# Patient Record
Sex: FEMALE | Employment: UNEMPLOYED | URBAN - METROPOLITAN AREA
[De-identification: names, ages, dates, MRNs, and addresses within clinical notes are randomized per-mention and may not be internally consistent; named-entity substitution may affect disease eponyms.]

---

## 2020-02-15 LAB — HBA1C MFR BLD HPLC: 6.6 %

## 2020-08-17 ENCOUNTER — VIRTUAL VISIT (OUTPATIENT)
Dept: FAMILY MEDICINE CLINIC | Facility: CLINIC | Age: 45
End: 2020-08-17

## 2020-08-17 NOTE — PROGRESS NOTES
Pt feeling better and wanted to cancel appt but was not able to get through so asked that I cancel it today when we connected.

## 2020-09-07 ENCOUNTER — HOSPITAL ENCOUNTER (EMERGENCY)
Age: 45
Discharge: HOME | End: 2020-09-07
Attending: EMERGENCY MEDICINE
Payer: MEDICARE

## 2020-09-07 VITALS
HEART RATE: 73 BPM | DIASTOLIC BLOOD PRESSURE: 60 MMHG | SYSTOLIC BLOOD PRESSURE: 134 MMHG | TEMPERATURE: 97.9 F | RESPIRATION RATE: 14 BRPM | OXYGEN SATURATION: 100 %

## 2020-09-07 DIAGNOSIS — K31.84 GASTROPARESIS: ICD-10-CM

## 2020-09-07 DIAGNOSIS — E10.65 TYPE 1 DIABETES MELLITUS WITH HYPERGLYCEMIA (HCC): Primary | ICD-10-CM

## 2020-09-07 LAB
ALBUMIN SERPL-MCNC: 4.3 G/DL (ref 3.4–5)
ALBUMIN/GLOB SERPL: 1.3 {RATIO} (ref 0.8–1.7)
ALP SERPL-CCNC: 114 U/L (ref 45–117)
ALT SERPL-CCNC: 36 U/L (ref 13–56)
ANION GAP SERPL CALC-SCNC: 8 MMOL/L (ref 3–18)
AST SERPL-CCNC: 50 U/L (ref 10–38)
BASOPHILS # BLD: 0 K/UL (ref 0–0.1)
BASOPHILS NFR BLD: 0 % (ref 0–2)
BILIRUB SERPL-MCNC: 0.3 MG/DL (ref 0.2–1)
BUN SERPL-MCNC: 11 MG/DL (ref 7–18)
BUN/CREAT SERPL: 13 (ref 12–20)
CALCIUM SERPL-MCNC: 8.9 MG/DL (ref 8.5–10.1)
CHLORIDE SERPL-SCNC: 104 MMOL/L (ref 100–111)
CO2 SERPL-SCNC: 22 MMOL/L (ref 21–32)
CREAT SERPL-MCNC: 0.88 MG/DL (ref 0.6–1.3)
DIFFERENTIAL METHOD BLD: ABNORMAL
EOSINOPHIL # BLD: 0.1 K/UL (ref 0–0.4)
EOSINOPHIL NFR BLD: 0 % (ref 0–5)
ERYTHROCYTE [DISTWIDTH] IN BLOOD BY AUTOMATED COUNT: 12.7 % (ref 11.6–14.5)
ETHANOL SERPL-MCNC: <3 MG/DL (ref 0–3)
GLOBULIN SER CALC-MCNC: 3.2 G/DL (ref 2–4)
GLUCOSE BLD STRIP.AUTO-MCNC: 263 MG/DL (ref 70–110)
GLUCOSE SERPL-MCNC: 375 MG/DL (ref 74–99)
HCT VFR BLD AUTO: 43.2 % (ref 35–45)
HGB BLD-MCNC: 15 G/DL (ref 12–16)
LIPASE SERPL-CCNC: 54 U/L (ref 73–393)
LYMPHOCYTES # BLD: 2.8 K/UL (ref 0.9–3.6)
LYMPHOCYTES NFR BLD: 21 % (ref 21–52)
MAGNESIUM SERPL-MCNC: 2.1 MG/DL (ref 1.6–2.6)
MCH RBC QN AUTO: 31.6 PG (ref 24–34)
MCHC RBC AUTO-ENTMCNC: 34.7 G/DL (ref 31–37)
MCV RBC AUTO: 91.1 FL (ref 74–97)
MONOCYTES # BLD: 0.8 K/UL (ref 0.05–1.2)
MONOCYTES NFR BLD: 6 % (ref 3–10)
NEUTS SEG # BLD: 9.9 K/UL (ref 1.8–8)
NEUTS SEG NFR BLD: 73 % (ref 40–73)
PH BLDV: 7.43 [PH] (ref 7.32–7.42)
PLATELET # BLD AUTO: 335 K/UL (ref 135–420)
PMV BLD AUTO: 10.4 FL (ref 9.2–11.8)
POTASSIUM SERPL-SCNC: 4.4 MMOL/L (ref 3.5–5.5)
PROT SERPL-MCNC: 7.5 G/DL (ref 6.4–8.2)
RBC # BLD AUTO: 4.74 M/UL (ref 4.2–5.3)
SODIUM SERPL-SCNC: 134 MMOL/L (ref 136–145)
WBC # BLD AUTO: 13.5 K/UL (ref 4.6–13.2)

## 2020-09-07 PROCEDURE — C9113 INJ PANTOPRAZOLE SODIUM, VIA: HCPCS | Performed by: EMERGENCY MEDICINE

## 2020-09-07 PROCEDURE — 96374 THER/PROPH/DIAG INJ IV PUSH: CPT

## 2020-09-07 PROCEDURE — 80307 DRUG TEST PRSMV CHEM ANLYZR: CPT

## 2020-09-07 PROCEDURE — 82800 BLOOD PH: CPT

## 2020-09-07 PROCEDURE — 74011250636 HC RX REV CODE- 250/636: Performed by: EMERGENCY MEDICINE

## 2020-09-07 PROCEDURE — 93005 ELECTROCARDIOGRAM TRACING: CPT

## 2020-09-07 PROCEDURE — 85025 COMPLETE CBC W/AUTO DIFF WBC: CPT

## 2020-09-07 PROCEDURE — 83690 ASSAY OF LIPASE: CPT

## 2020-09-07 PROCEDURE — 96375 TX/PRO/DX INJ NEW DRUG ADDON: CPT

## 2020-09-07 PROCEDURE — 80053 COMPREHEN METABOLIC PANEL: CPT

## 2020-09-07 PROCEDURE — 83735 ASSAY OF MAGNESIUM: CPT

## 2020-09-07 PROCEDURE — 82962 GLUCOSE BLOOD TEST: CPT

## 2020-09-07 PROCEDURE — 99285 EMERGENCY DEPT VISIT HI MDM: CPT

## 2020-09-07 RX ORDER — DIPHENHYDRAMINE HYDROCHLORIDE 50 MG/ML
25 INJECTION, SOLUTION INTRAMUSCULAR; INTRAVENOUS ONCE
Status: COMPLETED | OUTPATIENT
Start: 2020-09-07 | End: 2020-09-07

## 2020-09-07 RX ORDER — MORPHINE SULFATE 2 MG/ML
4 INJECTION, SOLUTION INTRAMUSCULAR; INTRAVENOUS ONCE
Status: COMPLETED | OUTPATIENT
Start: 2020-09-07 | End: 2020-09-07

## 2020-09-07 RX ORDER — DIAZEPAM 10 MG/2ML
5 INJECTION INTRAMUSCULAR
Status: COMPLETED | OUTPATIENT
Start: 2020-09-07 | End: 2020-09-07

## 2020-09-07 RX ORDER — PREDNISONE 50 MG/1
50 TABLET ORAL DAILY
Qty: 3 TAB | Refills: 0 | Status: SHIPPED | OUTPATIENT
Start: 2020-09-07 | End: 2020-09-10

## 2020-09-07 RX ORDER — OMEPRAZOLE 40 MG/1
40 CAPSULE, DELAYED RELEASE ORAL DAILY
Qty: 5 CAP | Refills: 0 | Status: SHIPPED | OUTPATIENT
Start: 2020-09-07 | End: 2022-10-21

## 2020-09-07 RX ORDER — PANTOPRAZOLE SODIUM 40 MG/10ML
40 INJECTION, POWDER, LYOPHILIZED, FOR SOLUTION INTRAVENOUS
Status: COMPLETED | OUTPATIENT
Start: 2020-09-07 | End: 2020-09-07

## 2020-09-07 RX ORDER — HALOPERIDOL 5 MG/ML
5 INJECTION INTRAMUSCULAR
Status: COMPLETED | OUTPATIENT
Start: 2020-09-07 | End: 2020-09-07

## 2020-09-07 RX ORDER — INSULIN LISPRO 100 [IU]/ML
12 INJECTION, SOLUTION INTRAVENOUS; SUBCUTANEOUS
COMMUNITY
End: 2021-05-27 | Stop reason: ALTCHOICE

## 2020-09-07 RX ORDER — METOCLOPRAMIDE HYDROCHLORIDE 5 MG/ML
10 INJECTION INTRAMUSCULAR; INTRAVENOUS
Status: COMPLETED | OUTPATIENT
Start: 2020-09-07 | End: 2020-09-07

## 2020-09-07 RX ADMIN — HALOPERIDOL LACTATE 5 MG: 5 INJECTION, SOLUTION INTRAMUSCULAR at 08:30

## 2020-09-07 RX ADMIN — METOCLOPRAMIDE 10 MG: 5 INJECTION, SOLUTION INTRAMUSCULAR; INTRAVENOUS at 07:15

## 2020-09-07 RX ADMIN — DIPHENHYDRAMINE HYDROCHLORIDE 25 MG: 50 INJECTION, SOLUTION INTRAMUSCULAR; INTRAVENOUS at 07:14

## 2020-09-07 RX ADMIN — MORPHINE SULFATE 4 MG: 2 INJECTION, SOLUTION INTRAMUSCULAR; INTRAVENOUS at 07:16

## 2020-09-07 RX ADMIN — SODIUM CHLORIDE 1000 ML: 900 INJECTION, SOLUTION INTRAVENOUS at 07:15

## 2020-09-07 RX ADMIN — DIAZEPAM 5 MG: 5 INJECTION, SOLUTION INTRAMUSCULAR; INTRAVENOUS at 07:58

## 2020-09-07 RX ADMIN — PANTOPRAZOLE SODIUM 40 MG: 40 INJECTION, POWDER, FOR SOLUTION INTRAVENOUS at 10:18

## 2020-09-07 RX ADMIN — SODIUM CHLORIDE 1000 ML: 900 INJECTION, SOLUTION INTRAVENOUS at 09:16

## 2020-09-07 NOTE — ED NOTES
I have reviewed discharge information with the patient. Patient expressed understanding. Vss, pt in NAD. Patient ambulated independently out of care area.  [facility]

## 2020-09-07 NOTE — ED NOTES
Pt arrived with 1l bottle of water. Bottle noted to be 3/4 gone. I asked pt if she has been drinking and pt states she has been thirsty.

## 2020-09-07 NOTE — ED PROVIDER NOTES
EMERGENCY DEPARTMENT HISTORY AND PHYSICAL EXAM    7:02 AM      Date: 9/7/2020  Patient Name: Nathan Barrios    History of Presenting Illness     Chief Complaint   Patient presents with    Abdominal Pain         History Provided By: patient    Additional History (Context): Nathan Barrios is a 38 y.o. female presents with type 1 diabetes and gastroparesis history, comes in with another episode of gastroparesis starting at 5 in the morning continuous dry heaving very severe epigastric pain characteristic of her gastroparesis episodes. Denies drug use. She had a similar episode on the third of the month. No diarrhea or fever. Did not check her sugar this morning but by EMS was 350. Says morphine and Reglan seem to work best for her. Grayson Javier PCP: Elissa Ibrahim PA-C    Chief Complaint:   Duration:    Timing:    Location:   Quality:   Severity:   Modifying Factors:   Associated Symptoms:       Current Outpatient Medications   Medication Sig Dispense Refill    sertraline HCl (ZOLOFT PO) Take  by mouth.  insulin lispro (HumaLOG U-100 Insulin) 100 unit/mL injection by SubCUTAneous route.  predniSONE (DELTASONE) 50 mg tablet Take 1 Tab by mouth daily for 3 days. 3 Tab 0    omeprazole (PRILOSEC) 40 mg capsule Take 1 Cap by mouth daily. 5 Cap 0       Past History     Past Medical History:  Past Medical History:   Diagnosis Date    Depression     Diabetes ([address]     Gastroparesis     PTSD (post-traumatic stress disorder)        Past Surgical History:  Past Surgical History:   Procedure Laterality Date    HX APPENDECTOMY         Family History:  History reviewed. No pertinent family history. Social History:  Social History     Tobacco Use    Smoking status: Not on file   Substance Use Topics    Alcohol use: Yes    Drug use: Not Currently       Allergies:   Allergies   Allergen Reactions    Amoxicillin Nausea and Vomiting         Review of Systems     Review of Systems   Constitutional: Negative for diaphoresis and fever. HENT: Negative for congestion and sore throat. Eyes: Negative for pain and itching. Respiratory: Negative for cough and shortness of breath. Cardiovascular: Negative for chest pain and palpitations. Gastrointestinal: Positive for abdominal pain and vomiting. Negative for diarrhea. Endocrine: Negative for polydipsia and polyuria. Genitourinary: Negative for dysuria and hematuria. Musculoskeletal: Negative for arthralgias and myalgias. Skin: Negative for rash and wound. Neurological: Negative for seizures and syncope. Hematological: Does not bruise/bleed easily. Psychiatric/Behavioral: Negative for agitation and hallucinations. Physical Exam       Patient Vitals for the past 12 hrs:   Temp Pulse Resp BP SpO2   09/07/20 0900 -- -- -- 134/60 --   09/07/20 0830 -- -- -- 200/78 --   09/07/20 0800 -- -- -- 177/76 --   09/07/20 0730 -- -- -- 174/78 --   09/07/20 0701 97.9 °F (36.6 °C) 73 14 (!) 197/106 100 %   09/07/20 0700 -- -- -- (!) 197/106 --       Physical Exam  Vitals signs and nursing note reviewed. Constitutional:       General: She is in acute distress. Appearance: She is well-developed. She is diaphoretic. HENT:      Head: Normocephalic and atraumatic. Eyes:      General: No scleral icterus. Conjunctiva/sclera: Conjunctivae normal.   Neck:      Musculoskeletal: Normal range of motion and neck supple. Vascular: No JVD. Cardiovascular:      Rate and Rhythm: Normal rate and regular rhythm. Heart sounds: Normal heart sounds. Comments: 4 intact extremity pulses  Pulmonary:      Effort: Pulmonary effort is normal.      Breath sounds: Normal breath sounds. Abdominal:      Palpations: Abdomen is soft. There is no mass. Tenderness: There is abdominal tenderness in the epigastric area. Comments: Actively dry heaving. Musculoskeletal: Normal range of motion.    Lymphadenopathy:      Cervical: No cervical adenopathy. Skin:     General: Skin is warm. Neurological:      Mental Status: She is alert. Diagnostic Study Results   Labs -  Recent Results (from the past 12 hour(s))   CBC WITH AUTOMATED DIFF    Collection Time: 09/07/20  7:05 AM   Result Value Ref Range    WBC 13.5 (H) 4.6 - 13.2 K/uL    RBC 4.74 4.20 - 5.30 M/uL    HGB 15.0 12.0 - 16.0 g/dL    HCT 43.2 35.0 - 45.0 %    MCV 91.1 74.0 - 97.0 FL    MCH 31.6 24.0 - 34.0 PG    MCHC 34.7 31.0 - 37.0 g/dL    RDW 12.7 11.6 - 14.5 %    PLATELET 348 332 - 483 K/uL    MPV 10.4 9.2 - 11.8 FL    NEUTROPHILS 73 40 - 73 %    LYMPHOCYTES 21 21 - 52 %    MONOCYTES 6 3 - 10 %    EOSINOPHILS 0 0 - 5 %    BASOPHILS 0 0 - 2 %    ABS. NEUTROPHILS 9.9 (H) 1.8 - 8.0 K/UL    ABS. LYMPHOCYTES 2.8 0.9 - 3.6 K/UL    ABS. MONOCYTES 0.8 0.05 - 1.2 K/UL    ABS. EOSINOPHILS 0.1 0.0 - 0.4 K/UL    ABS. BASOPHILS 0.0 0.0 - 0.1 K/UL    DF AUTOMATED     MAGNESIUM    Collection Time: 09/07/20  7:05 AM   Result Value Ref Range    Magnesium 2.1 1.6 - 2.6 mg/dL   METABOLIC PANEL, COMPREHENSIVE    Collection Time: 09/07/20  7:05 AM   Result Value Ref Range    Sodium 134 (L) 136 - 145 mmol/L    Potassium 4.4 3.5 - 5.5 mmol/L    Chloride 104 100 - 111 mmol/L    CO2 22 21 - 32 mmol/L    Anion gap 8 3.0 - 18 mmol/L    Glucose 375 (H) 74 - 99 mg/dL    BUN 11 7.0 - 18 MG/DL    Creatinine 0.88 0.6 - 1.3 MG/DL    BUN/Creatinine ratio 13 12 - 20      GFR est AA >60 >60 ml/min/1.73m2    GFR est non-AA >60 >60 ml/min/1.73m2    Calcium 8.9 8.5 - 10.1 MG/DL    Bilirubin, total 0.3 0.2 - 1.0 MG/DL    ALT (SGPT) 36 13 - 56 U/L    AST (SGOT) 50 (H) 10 - 38 U/L    Alk.  phosphatase 114 45 - 117 U/L    Protein, total 7.5 6.4 - 8.2 g/dL    Albumin 4.3 3.4 - 5.0 g/dL    Globulin 3.2 2.0 - 4.0 g/dL    A-G Ratio 1.3 0.8 - 1.7     LIPASE    Collection Time: 09/07/20  7:05 AM   Result Value Ref Range    Lipase 54 (L) 73 - 393 U/L   PH, VENOUS    Collection Time: 09/07/20  7:05 AM   Result Value Ref Range VENOUS PH 7.43 (H) 7.32 - 7.42     ETHYL ALCOHOL    Collection Time: 09/07/20  7:05 AM   Result Value Ref Range    ALCOHOL(ETHYL),SERUM <3 0 - 3 MG/DL   EKG, 12 LEAD, INITIAL    Collection Time: 09/07/20  8:02 AM   Result Value Ref Range    Ventricular Rate 82 BPM    Atrial Rate 82 BPM    P-R Interval 126 ms    QRS Duration 78 ms    Q-T Interval 394 ms    QTC Calculation (Bezet) 460 ms    Calculated P Axis 71 degrees    Calculated R Axis 34 degrees    Calculated T Axis 28 degrees    Diagnosis       Normal sinus rhythm  Normal ECG  No previous ECGs available         Radiologic Studies -   No orders to display     No results found. Medications ordered:   Medications   sodium chloride 0.9 % bolus infusion 1,000 mL (0 mL IntraVENous IV Completed 9/7/20 0745)   morphine injection 4 mg (4 mg IntraVENous Given 9/7/20 0716)   metoclopramide HCl (REGLAN) injection 10 mg (10 mg IntraVENous Given 9/7/20 0715)   diphenhydrAMINE (BENADRYL) injection 25 mg (25 mg IntraVENous Given 9/7/20 0714)   diazePAM (VALIUM) injection 5 mg (5 mg IntraVENous Given 9/7/20 0758)   haloperidol lactate (HALDOL) injection 5 mg (5 mg IntraVENous Given 9/7/20 0830)   sodium chloride 0.9 % bolus infusion 1,000 mL (1,000 mL IntraVENous New Bag 9/7/20 0916)   pantoprazole (PROTONIX) injection 40 mg (40 mg IntraVENous Given 9/7/20 1018)         Medical Decision Making   Initial Medical Decision Making and DDx:     Most suggestive of gastroparesis. Do not suspect particularly pancreatitis acute cholecystitis. DKA is possible. ED Course: Progress Notes, Reevaluation, and Consults:  ED Course as of Sep 07 1113   Mon Sep 07, 2020   0805 Twelve-lead EKG at 8:02 AM, sinus rhythm at 82 no prolongation of the QTC. [CB]   0827 Patient still feeling nauseous, she is no longer vomiting or dry heaving. We will try Haldol.    [CB]   3242 No evidence of DKA or hyperosmolar state. Consistent with gastroparesis.   We will continue to treat.    [CB]   2950 Vomiting is subsided she is received 2 L of fluid, will recheck her blood sugar and likely discharge her. No alarming metabolic abnormalities. [CB]      ED Course User Index  [CB] Elijah Zaragoza MD     11:13 AM she still feels uncomfortable but no for other dry heaving. We will put her on a few days of omeprazole and recheck her blood sugar. I am the first provider for this patient. I reviewed the vital signs, available nursing notes, past medical history, past surgical history, family history and social history. Patient Vitals for the past 12 hrs:   Temp Pulse Resp BP SpO2   09/07/20 0900 -- -- -- 134/60 --   09/07/20 0830 -- -- -- 200/78 --   09/07/20 0800 -- -- -- 177/76 --   09/07/20 0730 -- -- -- 174/78 --   09/07/20 0701 97.9 °F (36.6 °C) 73 14 (!) 197/106 100 %   09/07/20 0700 -- -- -- (!) 197/106 --       Vital Signs-Reviewed the patient's vital signs. Pulse Oximetry Analysis, Cardiac Monitor, 12 lead ekg:      Interpreted by the EP. Records Reviewed: Nursing notes reviewed (Time of Review: 7:02 AM)    Procedures:   Critical Care Time:   Aspirin: (was aspirin given for stroke?)    Diagnosis     Clinical Impression:   1. Type 1 diabetes mellitus with hyperglycemia (HCC)    2. Gastroparesis        Disposition: Discharged      Follow-up Information     Follow up With Specialties Details Why Contact Info    Pilo Liu PA-C Physician Assistant In 2 days  [address] [address]  [phone]             Patient's Medications   Start Taking    OMEPRAZOLE (PRILOSEC) 40 MG CAPSULE    Take 1 Cap by mouth daily. PREDNISONE (DELTASONE) 50 MG TABLET    Take 1 Tab by mouth daily for 3 days. Continue Taking    INSULIN LISPRO (HUMALOG U-100 INSULIN) 100 UNIT/ML INJECTION    by SubCUTAneous route. SERTRALINE HCL (ZOLOFT PO)    Take  by mouth.    These Medications have changed    No medications on file   Stop Taking    No medications on file     _______________________________    Notes:    Gladys Rash, MD using Dragon dictation      _______________________________

## 2020-09-07 NOTE — ED NOTES
Pt observed sticking her fingers down her throat. Pt encouraged to stop. Eloina Urias \"This is how I make the nausea go away, I have been doing this for years. \"

## 2020-09-07 NOTE — DISCHARGE INSTRUCTIONS
Patient Education        Diabetes Blood Sugar Emergencies: Your Action Plan  How can you prevent a blood sugar emergency? An important part of living with diabetes is keeping your blood sugar in your target range. You'll need to know what to do if it's too high or too low. Managing your blood sugar levels helps you avoid emergencies. This care sheet will teach you about the signs of high and low blood sugar. It will help you make an action plan with your doctor for when these signs occur. Low blood sugar is more likely to happen if you take certain medicines for diabetes. It can also happen if you skip a meal, drink alcohol, or exercise more than usual.  You may get high blood sugar if you eat differently than you normally do. One example is eating more carbohydrate than usual. Having a cold, the flu, or other sudden illness can also cause high blood sugar levels. Levels can also rise if you miss a dose of medicine. Any change in how you take your medicine may affect your blood sugar level. So it's important to work with your doctor before you make any changes. Check your blood sugar  Work with your doctor to fill in the blank spaces below that apply to you. Track your levels, know your target range, and write down ways you can get your blood sugar back in your target range. A log book can help you track your levels. Take the book to all of your medical appointments. · Check your blood sugar _____ times a day, at these times:________________________________________________. (For example: Before meals, at bedtime, before exercise, during exercise, other.)  · Your blood sugar target range before a meal is ___________________. Your blood sugar target range after a meal is _______________________. · Do this--___________________________________________________--to get your blood sugar back within your safe range if your blood sugar results are _________________________________________.  (For example: Less than 70 or above 250 mg/dL.)  Call your doctor when your blood sugar results are ___________________________________. (For example: Less than 70 or above 250 mg/dL.)  What are the symptoms of low and high blood sugar? Common symptoms of low blood sugar are sweating and feeling shaky, weak, hungry, or confused. Symptoms can start quickly. Common symptoms of high blood sugar are feeling very thirsty or very hungry. You may also pass urine more often than usual. You may have blurry vision and may lose weight without trying. But some people may have high or low blood sugar without having any symptoms. That's a good reason to check your blood sugar on a regular schedule. What should you do if you have symptoms? Work with your doctor to fill in the blank spaces below that apply to you. Low blood sugar  If you have symptoms of low blood sugar, check your blood sugar. If it's below _____ ( for example, below 70), eat or drink a quick-sugar food that has about 15 grams of carbohydrate. Your goal is to get your level back to your safe range. Check your blood sugar again 15 minutes later. If it's still not in your target range, take another 15 grams of carbohydrate and check your blood sugar again in 15 minutes. Repeat this until you reach your target. Then go back to your regular testing schedule. Children usually need less than 15 grams of carbohydrate. Check with your doctor or diabetes educator for the amount that is right for your child. When you have low blood sugar, it's best to stop or reduce any physical activity until your blood sugar is back in your target range and is stable. If you must stay active, eat or drink 30 grams of carbohydrate. Then check your blood sugar again in 15 minutes. If it's not in your target range, take another 30 grams of carbohydrates. Check your blood sugar again in 15 minutes. Keep doing this until you reach your target. You can then go back to your regular testing schedule.   If your symptoms or blood sugar levels are getting worse or have not improved after 15 minutes, seek medical care right away. Here are some examples of quick-sugar foods with 15 grams of carbohydrate:  · 3 or 4 glucose tablets  · 1 tablespoon (3 teaspoons) table sugar  · ½ cup to ¾ cup (4 to 6 ounces) of fruit juice or regular (not diet) soda  · Hard candy (such as 6 Life Savers)  High blood sugar  If you have symptoms of high blood sugar, check your blood sugar. Your goal is to get your level back to your target range. If it's above ______ ( for example, above 250), follow these steps:  · If you missed a dose of your diabetes medicine, take it now. Take only the amount of medicine that you have been prescribed. Do not take more or less medicine. · Give yourself insulin if your doctor has prescribed it for high blood sugar. · Test for ketones, if the doctor told you to do so. If the results of the ketone test show a moderate-to-large amount of ketones, call the doctor for advice. · Wait 30 minutes after you take the extra insulin or the missed medicine. Check your blood sugar again. If your symptoms or blood sugar levels are getting worse or have not improved after taking these steps, seek medical care right away. Follow-up care is a key part of your treatment and safety. Be sure to make and go to all appointments, and call your doctor if you are having problems. It's also a good idea to know your test results and keep a list of the medicines you take. Where can you learn more? Go to http://www.gray.com/  Enter R769 in the search box to learn more about \"Diabetes Blood Sugar Emergencies: Your Action Plan. \"  Current as of: December 20, 2019               Content Version: 12.6  © 9391-0859 [employer]. Care instructions adapted under license by [employer] (which disclaims liability or warranty for this information).  If you have questions about a medical condition or this instruction, always ask your healthcare professional. [place] [address] any warranty or liability for your use of this information.

## 2020-09-07 NOTE — ED TRIAGE NOTES
Patient comes in by EMS for abdominal pain. Patient has a history of diabetes and apparently was drinking all night. Patient states that she is here for pain and nausea medicine and she gets it every time that she comes in.

## 2020-09-08 LAB
ATRIAL RATE: 82 BPM
CALCULATED P AXIS, ECG09: 71 DEGREES
CALCULATED R AXIS, ECG10: 34 DEGREES
CALCULATED T AXIS, ECG11: 28 DEGREES
DIAGNOSIS, 93000: NORMAL
P-R INTERVAL, ECG05: 126 MS
Q-T INTERVAL, ECG07: 394 MS
QRS DURATION, ECG06: 78 MS
QTC CALCULATION (BEZET), ECG08: 460 MS
VENTRICULAR RATE, ECG03: 82 BPM

## 2021-05-24 ENCOUNTER — OFFICE VISIT (OUTPATIENT)
Dept: FAMILY MEDICINE CLINIC | Age: 46
End: 2021-05-24
Payer: MEDICARE

## 2021-05-24 VITALS
WEIGHT: 156 LBS | OXYGEN SATURATION: 97 % | HEART RATE: 85 BPM | HEIGHT: 64 IN | SYSTOLIC BLOOD PRESSURE: 129 MMHG | TEMPERATURE: 98.1 F | BODY MASS INDEX: 26.63 KG/M2 | DIASTOLIC BLOOD PRESSURE: 80 MMHG | RESPIRATION RATE: 17 BRPM

## 2021-05-24 DIAGNOSIS — L73.9 FOLLICULITIS: ICD-10-CM

## 2021-05-24 DIAGNOSIS — L30.9 DERMATITIS: ICD-10-CM

## 2021-05-24 DIAGNOSIS — Z00.00 INITIAL MEDICARE ANNUAL WELLNESS VISIT: ICD-10-CM

## 2021-05-24 DIAGNOSIS — Z76.89 ESTABLISHING CARE WITH NEW DOCTOR, ENCOUNTER FOR: ICD-10-CM

## 2021-05-24 DIAGNOSIS — E10.9 TYPE 1 DIABETES MELLITUS WITHOUT COMPLICATION (HCC): Primary | ICD-10-CM

## 2021-05-24 PROCEDURE — 3044F HG A1C LEVEL LT 7.0%: CPT | Performed by: PHYSICIAN ASSISTANT

## 2021-05-24 PROCEDURE — G8419 CALC BMI OUT NRM PARAM NOF/U: HCPCS | Performed by: PHYSICIAN ASSISTANT

## 2021-05-24 PROCEDURE — 99214 OFFICE O/P EST MOD 30 MIN: CPT | Performed by: PHYSICIAN ASSISTANT

## 2021-05-24 PROCEDURE — G0438 PPPS, INITIAL VISIT: HCPCS | Performed by: PHYSICIAN ASSISTANT

## 2021-05-24 PROCEDURE — 81003 URINALYSIS AUTO W/O SCOPE: CPT | Performed by: PHYSICIAN ASSISTANT

## 2021-05-24 PROCEDURE — G9717 DOC PT DX DEP/BP F/U NT REQ: HCPCS | Performed by: PHYSICIAN ASSISTANT

## 2021-05-24 PROCEDURE — 82044 UR ALBUMIN SEMIQUANTITATIVE: CPT | Performed by: PHYSICIAN ASSISTANT

## 2021-05-24 PROCEDURE — 2022F DILAT RTA XM EVC RTNOPTHY: CPT | Performed by: PHYSICIAN ASSISTANT

## 2021-05-24 PROCEDURE — G8427 DOCREV CUR MEDS BY ELIG CLIN: HCPCS | Performed by: PHYSICIAN ASSISTANT

## 2021-05-24 PROCEDURE — 83036 HEMOGLOBIN GLYCOSYLATED A1C: CPT | Performed by: PHYSICIAN ASSISTANT

## 2021-05-24 RX ORDER — INSULIN GLARGINE 100 [IU]/ML
32 INJECTION, SOLUTION SUBCUTANEOUS DAILY
COMMUNITY
End: 2021-05-27 | Stop reason: SDUPTHER

## 2021-05-24 RX ORDER — INSULIN GLARGINE 100 [IU]/ML
32 INJECTION, SOLUTION SUBCUTANEOUS
COMMUNITY
End: 2021-05-24 | Stop reason: ALTCHOICE

## 2021-05-24 RX ORDER — FLUOCINONIDE 0.5 MG/G
OINTMENT TOPICAL 2 TIMES DAILY
Qty: 15 G | Refills: 0 | Status: SHIPPED | OUTPATIENT
Start: 2021-05-24 | End: 2021-10-26

## 2021-05-24 RX ORDER — HYDROXYZINE 50 MG/1
50 TABLET, FILM COATED ORAL
COMMUNITY
Start: 2020-10-25

## 2021-05-24 RX ORDER — SULFAMETHOXAZOLE AND TRIMETHOPRIM 800; 160 MG/1; MG/1
1 TABLET ORAL 2 TIMES DAILY
Qty: 20 TABLET | Refills: 0 | Status: SHIPPED | OUTPATIENT
Start: 2021-05-24 | End: 2021-06-03

## 2021-05-27 RX ORDER — INSULIN GLARGINE 100 [IU]/ML
32 INJECTION, SOLUTION SUBCUTANEOUS DAILY
Qty: 10 PEN | Refills: 3 | Status: SHIPPED | OUTPATIENT
Start: 2021-05-27

## 2021-05-27 RX ORDER — INSULIN LISPRO 100 [IU]/ML
INJECTION, SOLUTION INTRAVENOUS; SUBCUTANEOUS
Qty: 10 PEN | Refills: 3 | Status: SHIPPED | OUTPATIENT
Start: 2021-05-27

## 2021-05-28 ENCOUNTER — TELEPHONE (OUTPATIENT)
Dept: FAMILY MEDICINE CLINIC | Age: 46
End: 2021-05-28

## 2021-05-28 DIAGNOSIS — E10.65 HYPERGLYCEMIA DUE TO TYPE 1 DIABETES MELLITUS (HCC): Primary | ICD-10-CM

## 2021-05-28 RX ORDER — BLOOD-GLUCOSE SENSOR
EACH MISCELLANEOUS
Qty: 10 DEVICE | Refills: 3 | Status: SHIPPED | OUTPATIENT
Start: 2021-05-28 | End: 2022-10-21

## 2021-05-28 RX ORDER — INSULIN PUMP SYRINGE, 3 ML
EACH MISCELLANEOUS
Qty: 1 KIT | Refills: 0 | Status: CANCELLED | OUTPATIENT
Start: 2021-05-28

## 2021-05-28 RX ORDER — BLOOD-GLUCOSE,RECEIVER,CONT
EACH MISCELLANEOUS
Qty: 1 EACH | Refills: 0 | Status: SHIPPED | OUTPATIENT
Start: 2021-05-28 | End: 2022-08-04

## 2021-05-28 RX ORDER — BLOOD-GLUCOSE TRANSMITTER
EACH MISCELLANEOUS
Qty: 1 DEVICE | Refills: 3 | Status: SHIPPED | OUTPATIENT
Start: 2021-05-28

## 2021-05-28 NOTE — TELEPHONE ENCOUNTER
Patient discussed using a machine for blood sugar with Ms Isaac. She was told to find out what her insurance will cover. It is a Dexcom machine [id number] . Her insurance told her it needs prior authorization, and to call [phone] for member pharmacy services. Please call her back when it is approved and submitted to the pharmacy.

## 2021-06-01 ENCOUNTER — TELEPHONE (OUTPATIENT)
Dept: FAMILY MEDICINE CLINIC | Age: 46
End: 2021-06-01

## 2021-06-09 LAB
BILIRUB UR QL STRIP: NEGATIVE
GLUCOSE UR-MCNC: NORMAL MG/DL
HBA1C MFR BLD HPLC: 6.8 %
KETONES P FAST UR STRIP-MCNC: NEGATIVE MG/DL
MICROALBUMIN UR TEST STR-MCNC: 0 MG/L
MICROALBUMIN/CREAT RATIO POC: <30 MG/G
PH UR STRIP: 7 [PH] (ref 4.6–8)
PROT UR QL STRIP: NEGATIVE
SP GR UR STRIP: 1.02 (ref 1–1.03)
UA UROBILINOGEN AMB POC: NORMAL (ref 0.2–1)
URINALYSIS CLARITY POC: CLEAR
URINALYSIS COLOR POC: NORMAL
URINE BLOOD POC: NEGATIVE
URINE LEUKOCYTES POC: NEGATIVE
URINE NITRITES POC: NEGATIVE

## 2021-06-09 NOTE — PROGRESS NOTES
HISTORY OF PRESENT ILLNESS  Oletta Siemens is a 41 y.o. female. HPI   Pt is being seen to [facility] care and for her wellness exam. She has a hx of type 1 diabetes and her BS are well controlled. She has a form she needs filled out to assist with her insulin and would like to see endo. She has a hx of alcohol abuse and marijuana use. She was recently in assisted but has been doing well since out. Her anxiety/depression are well controlled on meds. She does have a rash on her legs and states that she had it previously and it responded to bactrim so she would like a prescription for it. Past Medical History:   Diagnosis Date    Anxiety     Depression     Diabetes ([address]     DKA (diabetic ketoacidoses) (HCC)     Gastroparesis     Gastroparesis     PTSD (post-traumatic stress disorder)      Past Surgical History:   Procedure Laterality Date    HX APPENDECTOMY       Social History     Socioeconomic History    Marital status: UNKNOWN     Spouse name: Not on file    Number of children: Not on file    Years of education: Not on file    Highest education level: Not on file   Occupational History    Not on file   Tobacco Use    Smoking status: Former Smoker     Years: 5.00    Smokeless tobacco: Never Used   Substance and Sexual Activity    Alcohol use: Yes    Drug use: Not on file    Sexual activity: Not on file   Other Topics Concern    Not on file   Social History Narrative    Not on file     Social Determinants of Health     Financial Resource Strain:     Difficulty of Paying Living Expenses:    Food Insecurity:     Worried About Running Out of Food in the Last Year:     [address] in the Last Year:    Transportation Needs:     Lack of Transportation (Medical):      Lack of Transportation (Non-Medical):    Physical Activity:     Days of Exercise per Week:     Minutes of Exercise per Session:    Stress:     Feeling of Stress :    Social Connections:     Frequency of Communication with Friends and Family:     Frequency of Social Gatherings with Friends and Family:     Attends Islam Services:     Active Member of Clubs or Organizations:     Attends Club or Organization Meetings:     Marital Status:    Intimate Partner Violence:     Fear of Current or Ex-Partner:     Emotionally Abused:     Physically Abused:     Sexually Abused:      Family History   Problem Relation Age of Onset    Cancer Mother     Diabetes Mother     Diabetes Brother     Cancer Maternal Grandfather        Allergies   Allergen Reactions    Amoxicillin Nausea and Vomiting       Current Outpatient Medications   Medication Sig    insulin glargine (Basaglar KwikPen U-100 Insulin) 100 unit/mL (3 mL) inpn 32 Units by SubCUTAneous route daily.  insulin lispro (HUMALOG) 100 unit/mL kwikpen Inject 12units subQ with each of 2 largest meals    hydrOXYzine HCL (ATARAX) 50 mg tablet Take 50 mg by mouth.  fluocinoNIDE (LIDEX) 0.05 % ointment Apply  to affected area two (2) times a day.  sertraline HCl (ZOLOFT PO) Take  by mouth.  omeprazole (PRILOSEC) 40 mg capsule Take 1 Cap by mouth daily.  Blood-Glucose Meter,Continuous (Dexcom G6 ) misc Use as directed    Blood-Glucose Sensor (Dexcom G6 Sensor) loy Use as directed    Blood-Glucose Transmitter (Dexcom G6 Transmitter) loy Use as directed     No current facility-administered medications for this visit. Review of Systems   Constitutional: Negative. Negative for chills, fever and malaise/fatigue. HENT: Negative. Negative for congestion, ear pain, sore throat and tinnitus. Eyes: Negative. Negative for blurred vision, double vision and photophobia. Respiratory: Negative. Negative for cough, shortness of breath and wheezing. Cardiovascular: Negative. Negative for chest pain, palpitations and leg swelling. Gastrointestinal: Negative. Negative for abdominal pain, heartburn, nausea and vomiting. Genitourinary: Negative. Negative for dysuria, frequency, hematuria and urgency. Musculoskeletal: Negative. Negative for back pain, joint pain, myalgias and neck pain. Skin: Positive for itching and rash. Neurological: Negative. Negative for dizziness, tingling, tremors and headaches. Psychiatric/Behavioral: Positive for depression (controlled on med) and substance abuse (hx of not current). Negative for hallucinations, memory loss and suicidal ideas. The patient is nervous/anxious (controlled on meds). The patient does not have insomnia. Visit Vitals  /80 (BP 1 Location: Right arm, BP Patient Position: Sitting, BP Cuff Size: Large adult)   Pulse 85   Temp 98.1 °F (36.7 °C) (Temporal)   Resp 17   Ht 5' 4\" (1.626 m)   Wt 156 lb (70.8 kg)   SpO2 97%   BMI 26.78 kg/m²       Physical Exam  Vitals reviewed. Constitutional:       Appearance: She is well-developed. HENT:      Head: Normocephalic and atraumatic. Right Ear: Tympanic membrane, ear canal and external ear normal.      Left Ear: Tympanic membrane, ear canal and external ear normal.      Nose: Nose normal.      Mouth/Throat:      Pharynx: Uvula midline. Neck:      Thyroid: No thyromegaly. Cardiovascular:      Rate and Rhythm: Normal rate and regular rhythm. Heart sounds: Normal heart sounds. No murmur heard. No friction rub. No gallop. Pulmonary:      Effort: Pulmonary effort is normal. No respiratory distress. Breath sounds: Normal breath sounds. No wheezing or rales. Musculoskeletal:         General: No tenderness. Normal range of motion. Cervical back: Normal range of motion and neck supple. Skin:     General: Skin is warm and dry. Findings: Rash (lower leg) present. Rash is pustular. Neurological:      Mental Status: She is alert and oriented to person, place, and time. Psychiatric:         Behavior: Behavior normal.         ASSESSMENT and PLAN    ICD-10-CM ICD-9-CM    1.  Type 1 diabetes mellitus without complication ([facility]  E10.9 250.01 AMB POC HEMOGLOBIN A1C      AMB POC URINALYSIS DIP STICK AUTO W/O MICRO      REFERRAL TO ENDOCRINOLOGY      fluocinoNIDE (LIDEX) 0.05 % ointment      insulin glargine (Basaglar KwikPen U-100 Insulin) 100 unit/mL (3 mL) inpn      insulin lispro (HUMALOG) 100 unit/mL kwikpen      AMB POC URINE, MICROALBUMIN, SEMIQUANTITATIVE   2. Folliculitis  A70.1 187.5    3. Dermatitis  L30.9 692.9 trimethoprim-sulfamethoxazole (BACTRIM DS, SEPTRA DS) 160-800 mg per tablet   4. Initial Medicare annual wellness visit  Z00.00 V70.0    5. Establishing care with new doctor, encounter for  Z76.89 V65.8      Follow-up and Dispositions    · Return in about 3 months (around 8/24/2021) for follow up. Pt expressed understanding of visit summary and plans for any follow ups or referrals as well as any medications prescribed. Medicare Wellness Visit  Nanette Rucker is a 41 y.o. female and presents for annual Medicare Wellness Visit. Problem List: Reviewed with patient and discussed risk factors. Patient Active Problem List   Diagnosis Code    Depression F32.9       Current medical providers:  Patient Care Team:  Anabel Brooks PA-C as PCP - General (Physician Assistant)  Anabel Brooks PA-C as PCP [address]: Reviewed with patient  Past Surgical History:   Procedure Laterality Date    HX APPENDECTOMY          SH: Reviewed with patient  Social History     Tobacco Use    Smoking status: Former Smoker     Years: 5.00    Smokeless tobacco: Never Used   Substance Use Topics    Alcohol use:  Yes    Drug use: Not on file       FH: Reviewed with patient  Family History   Problem Relation Age of Onset   Irizarry Cancer Mother     Diabetes Mother     Diabetes Brother     Cancer Maternal Grandfather        Medications/Allergies: Reviewed with patient  Current Outpatient Medications on File Prior to Visit   Medication Sig Dispense Refill    hydrOXYzine HCL (ATARAX) 50 mg tablet Take 50 mg by mouth.  sertraline HCl (ZOLOFT PO) Take  by mouth.  omeprazole (PRILOSEC) 40 mg capsule Take 1 Cap by mouth daily. 5 Cap 0     No current facility-administered medications on file prior to visit. Allergies   Allergen Reactions    Amoxicillin Nausea and Vomiting       Objective:  Visit Vitals  /80 (BP 1 Location: Right arm, BP Patient Position: Sitting, BP Cuff Size: Large adult)   Pulse 85   Temp 98.1 °F (36.7 °C) (Temporal)   Resp 17   Ht 5' 4\" (1.626 m)   Wt 156 lb (70.8 kg)   LMP 04/26/2021   SpO2 97%   BMI 26.78 kg/m²    Body mass index is 26.78 kg/m². Assessment of cognitive impairment: Alert and oriented x 3    Depression Screen:   3 most recent PHQ Screens 5/24/2021   PHQ Not Done Active Diagnosis of Depression or Bipolar Disorder       Fall Risk Assessment:  No flowsheet data found. Functional Ability:   Does the patient exhibit a steady gait? yes   How long did it take the patient to get up and walk from a sitting position? <10sec   Is the patient self reliant?  (ie can do own laundry, meals, household chores)  yes     Does the patient handle his/her own medications? yes     Does the patient handle his/her own money? yes     Is the patients home safe (ie good lighting, handrails on stairs and bath, etc.)? yes     Did you notice or did patient express any hearing difficulties? no     Did you notice or did patient express any vision difficulties?   no     Were distance and reading eye charts used? yes       Advance Care Planning:   Patient was offered the opportunity to discuss advance care planning:  yes     Does patient have an Advance Directive:  no   If no, did you provide information on Caring Connections?   yes       Plan:      Orders Placed This Encounter    REFERRAL TO ENDOCRINOLOGY    AMB POC HEMOGLOBIN A1C    AMB POC URINALYSIS DIP STICK AUTO W/O MICRO    AMB POC URINE, MICROALBUMIN, SEMIQUANTITATIVE    DISCONTD: insulin glargine (LANTUS) 100 unit/mL injection    hydrOXYzine HCL (ATARAX) 50 mg tablet    DISCONTD: insulin glargine (Basaglar KwikPen U-100 Insulin) 100 unit/mL (3 mL) inpn    fluocinoNIDE (LIDEX) 0.05 % ointment    trimethoprim-sulfamethoxazole (BACTRIM DS, SEPTRA DS) 160-800 mg per tablet    insulin glargine (Basaglar KwikPen U-100 Insulin) 100 unit/mL (3 mL) inpn    insulin lispro (HUMALOG) 100 unit/mL kwikpen       Health Maintenance   Topic Date Due    Hepatitis C Screening  Never done    Pneumococcal 0-64 years (1 of 2 - PPSV23) Never done    Foot Exam Q1  Never done    Eye Exam Retinal or Dilated  Never done    COVID-19 Vaccine (1) Never done    DTaP/Tdap/Td series (1 - Tdap) Never done    PAP AKA CERVICAL CYTOLOGY  Never done    Flu Vaccine (Season Ended) 09/01/2021    Lipid Screen  10/24/2021    A1C test (Diabetic or Prediabetic)  05/24/2022    MICROALBUMIN Q1  05/24/2022    Medicare Yearly Exam  05/25/2022       *Patient verbalized understanding and agreement with the plan. A copy of the After Visit Summary with personalized health plan was given to the patient today.

## 2021-06-09 NOTE — PATIENT INSTRUCTIONS
Schedule of Personalized Health Plan  (Provide Copy to Patient)  The best way to stay healthy is to live a healthy lifestyle. A healthy lifestyle includes regular exercise, eating a well-balanced diet, keeping a healthy weight and not smoking. Regular physical exams and screening tests are another important way to take care of yourself. Preventive exams provided by health care providers can find health problems early when treatment works best and can keep you from getting certain diseases or illnesses. Preventive services include exams, lab tests, screenings, shots, monitoring and information to help you take care of your own health. All people over 65 should have a pneumonia shot. Pneumonia shots are usually only needed once in a lifetime unless your doctor decides differently. All people over 65 should have a yearly flu shot. People over 65 are at medium to high risk for Hepatitis B. Three shots are needed for complete protection. In addition to your physical exam, some screening tests are recommended:    Bone mass measurement (dexa scan) is recommended every two years  Diabetes Mellitus screening is recommended every year. Glaucoma is an eye disease caused by high pressure in the eye. An eye exam is recommended every year. Cardiovascular screening tests that check your cholesterol and other blood fat (lipid) levels are recommended every five years. Colorectal Cancer screening tests help to find pre-cancerous polyps (growths in the colon) so they can be removed before they turn into cancer. Tests ordered for screening depend on your personal and family history risk factors.     Screening for Breast Cancer is recommended yearly with a mammogram.    Screening for Cervical Cancer is recommended every two years (annually for certain risk factors, such as previous history of STD or abnormal PAP in past 7 years), with a Pelvic Exam with PAP    Here is a list of your current Health Maintenance items with a due date:  Health Maintenance   Topic Date Due    Hepatitis C Screening  Never done    Pneumococcal 0-64 years (1 of 2 - PPSV23) Never done    Foot Exam Q1  Never done    MICROALBUMIN Q1  Never done    Eye Exam Retinal or Dilated  Never done    COVID-19 Vaccine (1) Never done    DTaP/Tdap/Td series (1 - Tdap) Never done    PAP AKA CERVICAL CYTOLOGY  Never done    A1C test (Diabetic or Prediabetic)  02/15/2021    Flu Vaccine (Season Ended) 09/01/2021    Lipid Screen  10/24/2021    Medicare Yearly Exam  05/25/2022

## 2021-07-22 ENCOUNTER — VIRTUAL VISIT (OUTPATIENT)
Dept: FAMILY MEDICINE CLINIC | Age: 46
End: 2021-07-22
Payer: MEDICARE

## 2021-07-22 DIAGNOSIS — L30.9 DERMATITIS: ICD-10-CM

## 2021-07-22 DIAGNOSIS — E10.9 TYPE 1 DIABETES MELLITUS WITHOUT COMPLICATION (HCC): Primary | ICD-10-CM

## 2021-07-22 DIAGNOSIS — R21 RASH AND NONSPECIFIC SKIN ERUPTION: ICD-10-CM

## 2021-07-22 DIAGNOSIS — Z02.89 ENCOUNTER FOR COMPLETION OF FORM WITH PATIENT: ICD-10-CM

## 2021-07-22 PROCEDURE — 2022F DILAT RTA XM EVC RTNOPTHY: CPT | Performed by: PHYSICIAN ASSISTANT

## 2021-07-22 PROCEDURE — 3044F HG A1C LEVEL LT 7.0%: CPT | Performed by: PHYSICIAN ASSISTANT

## 2021-07-22 PROCEDURE — 99214 OFFICE O/P EST MOD 30 MIN: CPT | Performed by: PHYSICIAN ASSISTANT

## 2021-07-22 PROCEDURE — G8427 DOCREV CUR MEDS BY ELIG CLIN: HCPCS | Performed by: PHYSICIAN ASSISTANT

## 2021-07-22 PROCEDURE — G9717 DOC PT DX DEP/BP F/U NT REQ: HCPCS | Performed by: PHYSICIAN ASSISTANT

## 2021-07-22 RX ORDER — FLASH GLUCOSE SENSOR
1 KIT MISCELLANEOUS
Qty: 1 KIT | Refills: 3 | Status: SHIPPED | OUTPATIENT
Start: 2021-07-22 | End: 2021-11-04 | Stop reason: SDUPTHER

## 2021-07-22 RX ORDER — FLASH GLUCOSE SCANNING READER
1 EACH MISCELLANEOUS
Qty: 1 EACH | Refills: 3 | Status: SHIPPED | OUTPATIENT
Start: 2021-07-22

## 2021-07-22 NOTE — PROGRESS NOTES
HISTORY OF PRESENT ILLNESS  Siena Kaur is a 41 y.o. female. HPI   Pt is being seen via doxy. me for a rx for freestyle edita for her diabetes and for form completion for accommodations at work related to her diabetes. She states she needs to be allowed to pause a call  for a few min if she needs to suddenly check her BS or her BS drops and she also has had hospitalizations in the past in regards to her BS and she does not want to loose her job if that happens again. LAstly the rash on her legs has not improved and she would like a referral to derm. Allergies   Allergen Reactions    Amoxicillin Nausea and Vomiting       Current Outpatient Medications   Medication Sig    flash glucose scanning reader (FreeStyle Edita 14 Day [place]) misc 1 Each by Does Not Apply route every four (4) weeks.  flash glucose sensor (FreeStyle Edita 2 Sensor) kit 1 Device by Does Not Apply route every fourteen (14) days.  Blood-Glucose Meter,Continuous (Dexcom G6 ) misc Use as directed    Blood-Glucose Sensor (Dexcom G6 Sensor) loy Use as directed    Blood-Glucose Transmitter (Dexcom G6 Transmitter) loy Use as directed    insulin glargine (Basaglar KwikPen U-100 Insulin) 100 unit/mL (3 mL) inpn 32 Units by SubCUTAneous route daily.  insulin lispro (HUMALOG) 100 unit/mL kwikpen Inject 12units subQ with each of 2 largest meals    hydrOXYzine HCL (ATARAX) 50 mg tablet Take 50 mg by mouth.  fluocinoNIDE (LIDEX) 0.05 % ointment Apply  to affected area two (2) times a day.  sertraline HCl (ZOLOFT PO) Take  by mouth.  omeprazole (PRILOSEC) 40 mg capsule Take 1 Cap by mouth daily. No current facility-administered medications for this visit. Review of Systems   Constitutional: Negative. Negative for chills, fever and malaise/fatigue. HENT: Negative. Negative for congestion, ear pain, sore throat and tinnitus. Eyes: Negative. Negative for blurred vision, double vision and photophobia. Respiratory: Negative. Negative for cough, shortness of breath and wheezing. Cardiovascular: Negative. Negative for chest pain, palpitations and leg swelling. Gastrointestinal: Negative. Negative for abdominal pain, heartburn, nausea and vomiting. Genitourinary: Negative. Negative for dysuria, frequency, hematuria and urgency. Musculoskeletal: Negative. Negative for back pain, joint pain, myalgias and neck pain. Skin: Positive for rash. Negative for itching. Neurological: Negative. Negative for dizziness, tingling, tremors and headaches. Psychiatric/Behavioral: Positive for depression (controlled on med) and substance abuse (hx of not current). Negative for hallucinations, memory loss and suicidal ideas. The patient is nervous/anxious (controlled on meds). The patient does not have insomnia. Physical Exam  Constitutional:       General: She is not in acute distress. Appearance: Normal appearance. She is not ill-appearing. HENT:      Head: Normocephalic and atraumatic. Pulmonary:      Effort: No respiratory distress. Neurological:      Mental Status: She is alert and oriented to person, place, and time. Psychiatric:         Mood and Affect: Affect normal. Mood is anxious. Mood is not depressed. Behavior: Behavior normal. Behavior is cooperative. Thought Content: Thought content normal. Thought content is not paranoid. Thought content does not include homicidal or suicidal ideation. Judgment: Judgment normal.         ASSESSMENT and PLAN    ICD-10-CM ICD-9-CM    1. Type 1 diabetes mellitus without complication ([facility])  K15.2 250.01 flash glucose scanning reader (FreeStyle Janet 14 Day [place]) misc      flash glucose sensor (FreeStyle Janet 2 Sensor) kit   2. Dermatitis  L30.9 692.9    3.  Rash and nonspecific skin eruption  R21 782.1 REFERRAL TO DERMATOLOGY   4. Encounter for completion of form with patient  Z02.89 V68.89      Follow-up and Dispositions · Return in about 3 months (around 10/22/2021) for follow up. Pt expressed understanding of visit summary and plans for any follow ups or referrals as well as any medications prescribed. Seen by synchronous (real-time) audio-video technology via doxy. me on 7/22/2021    The patient is aware that this patient-initiated Telehealth encounter is a billable service, with coverage as determined by his or her insurance carrier. He/She is aware that they may receive a bill and has provided verbal consent to proceed: Yes        I was in the office while conducting this encounter.

## 2021-07-22 NOTE — PATIENT INSTRUCTIONS
Learning About Carbohydrate (Carb) Counting and Eating Out When You Have Diabetes  Why plan your meals? Meal planning can be a key part of managing diabetes. Planning meals and snacks with the right balance of carbohydrate, protein, and fat can help you keep your blood sugar at the target level you set with your doctor. You don't have to eat special foods. You can eat what your family eats, including sweets once in a while. But you do have to pay attention to how often you eat and how much you eat of certain foods. You may want to work with a dietitian or a certified diabetes educator. He or she can give you tips and meal ideas and can answer your questions about meal planning. This health professional can also help you reach a healthy weight if that is one of your goals. What should you know about eating carbs? Managing the amount of carbohydrate (carbs) you eat is an important part of healthy meals when you have diabetes. Carbohydrate is found in many foods. · Learn which foods have carbs. And learn the amounts of carbs in different foods. ? Bread, cereal, pasta, and rice have about 15 grams of carbs in a serving. A serving is 1 slice of bread (1 ounce), ½ cup of cooked cereal, or 1/3 cup of cooked pasta or rice. ? Fruits have 15 grams of carbs in a serving. A serving is 1 small fresh fruit, such as an apple or orange; ½ of a banana; ½ cup of cooked or canned fruit; ½ cup of fruit juice; 1 cup of melon or raspberries; or 2 tablespoons of dried fruit. ? Milk and no-sugar-added yogurt have 15 grams of carbs in a serving. A serving is 1 cup of milk or 2/3 cup of no-sugar-added yogurt. ? Starchy vegetables have 15 grams of carbs in a serving. A serving is ½ cup of mashed potatoes or sweet potato; 1 cup winter squash; ½ of a small baked potato; ½ cup of cooked beans; or ½ cup cooked corn or green peas.   · Learn how much carbs to eat each day and at each meal. A dietitian or CDE can teach you how to keep track of the amount of carbs you eat. This is called carbohydrate counting. · If you are not sure how to count carbohydrate grams, use the Plate Method to plan meals. It is a good, quick way to make sure that you have a balanced meal. It also helps you spread carbs throughout the day. ? Divide your plate by types of foods. Put non-starchy vegetables on half the plate, meat or other protein food on one-quarter of the plate, and a grain or starchy vegetable in the final quarter of the plate. To this you can add a small piece of fruit and 1 cup of milk or yogurt, depending on how many carbs you are supposed to eat at a meal.  · Try to eat about the same amount of carbs at each meal. Do not \"save up\" your daily allowance of carbs to eat at one meal.  · Proteins have very little or no carbs per serving. Examples of proteins are beef, chicken, turkey, fish, eggs, tofu, cheese, cottage cheese, and peanut butter. A serving size of meat is 3 ounces, which is about the size of a deck of cards. Examples of meat substitute serving sizes (equal to 1 ounce of meat) are 1/4 cup of cottage cheese, 1 egg, 1 tablespoon of peanut butter, and ½ cup of tofu. How can you eat out and still eat healthy? · Learn to estimate the serving sizes of foods that have carbohydrate. If you measure food at home, it will be easier to estimate the amount in a serving of restaurant food. · If the meal you order has too much carbohydrate (such as potatoes, corn, or baked beans), ask to have a low-carbohydrate food instead. Ask for a salad or green vegetables. · If you use insulin, check your blood sugar before and after eating out to help you plan how much to eat in the future. · If you eat more carbohydrate at a meal than you had planned, take a walk or do other exercise. This will help lower your blood sugar. What are some tips for eating healthy? · Limit saturated fat, such as the fat from meat and dairy products.  This is a healthy choice because people who have diabetes are at higher risk of heart disease. So choose lean cuts of meat and nonfat or low-fat dairy products. Use olive or canola oil instead of butter or shortening when cooking. · Don't skip meals. Your blood sugar may drop too low if you skip meals and take insulin or certain medicines for diabetes. · Check with your doctor before you drink alcohol. Alcohol can cause your blood sugar to drop too low. Alcohol can also cause a bad reaction if you take certain diabetes medicines. Follow-up care is a key part of your treatment and safety. Be sure to make and go to all appointments, and call your doctor if you are having problems. It's also a good idea to know your test results and keep a list of the medicines you take. Where can you learn more? Go to http://www.ponce.com/  Enter I147 in the search box to learn more about \"Learning About Carbohydrate (Carb) Counting and Eating Out When You Have Diabetes. \"  Current as of: August 31, 2020               Content Version: 12.8  © 2006-2021 [employer]. Care instructions adapted under license by [employer] (which disclaims liability or warranty for this information). If you have questions about a medical condition or this instruction, always ask your healthcare professional. [address] any warranty or liability for your use of this information.

## 2021-10-18 ENCOUNTER — TELEPHONE (OUTPATIENT)
Dept: FAMILY MEDICINE CLINIC | Age: 46
End: 2021-10-18

## 2021-10-18 DIAGNOSIS — G47.00 INSOMNIA, UNSPECIFIED TYPE: Primary | ICD-10-CM

## 2021-10-18 NOTE — TELEPHONE ENCOUNTER
Patient is calling to ask for melatonin and Hydrocodone for her gastroparisis. She states that she had a VV last week and was never contacted.   She does not want to go back to the Emergency Room

## 2021-10-19 RX ORDER — MELATONIN 3 MG
3 CAPSULE ORAL
Qty: 30 CAPSULE | Refills: 2 | Status: SHIPPED | OUTPATIENT
Start: 2021-10-19 | End: 2022-08-04

## 2021-10-20 NOTE — TELEPHONE ENCOUNTER
Pain meds can slow gastric emptying more and are not generally recommended for gastroparesis.  I will send melatonin but not sure insurance will cover it since it is OTC

## 2021-10-25 ENCOUNTER — TELEPHONE (OUTPATIENT)
Dept: FAMILY MEDICINE CLINIC | Age: 46
End: 2021-10-25

## 2021-10-25 DIAGNOSIS — E10.9 TYPE 1 DIABETES MELLITUS WITHOUT COMPLICATION (HCC): ICD-10-CM

## 2021-10-25 NOTE — TELEPHONE ENCOUNTER
Called pt and unable to leave message. Number no longer in service. The call was to inform pt  result .

## 2021-10-26 RX ORDER — FLUOCINONIDE 0.5 MG/G
OINTMENT TOPICAL
Qty: 15 G | Refills: 0 | Status: SHIPPED | OUTPATIENT
Start: 2021-10-26 | End: 2022-05-13 | Stop reason: SDUPTHER

## 2021-11-04 ENCOUNTER — VIRTUAL VISIT (OUTPATIENT)
Dept: FAMILY MEDICINE CLINIC | Age: 46
End: 2021-11-04
Payer: MEDICARE

## 2021-11-04 ENCOUNTER — TELEPHONE (OUTPATIENT)
Dept: FAMILY MEDICINE CLINIC | Age: 46
End: 2021-11-04

## 2021-11-04 DIAGNOSIS — K31.84 GASTROPARESIS: ICD-10-CM

## 2021-11-04 DIAGNOSIS — G47.00 INSOMNIA, UNSPECIFIED TYPE: ICD-10-CM

## 2021-11-04 DIAGNOSIS — E10.9 TYPE 1 DIABETES MELLITUS WITHOUT COMPLICATION (HCC): Primary | ICD-10-CM

## 2021-11-04 DIAGNOSIS — R10.9 ABDOMINAL CRAMPING: ICD-10-CM

## 2021-11-04 DIAGNOSIS — F32.A DEPRESSION, UNSPECIFIED DEPRESSION TYPE: ICD-10-CM

## 2021-11-04 PROCEDURE — 99214 OFFICE O/P EST MOD 30 MIN: CPT | Performed by: PHYSICIAN ASSISTANT

## 2021-11-04 PROCEDURE — 3044F HG A1C LEVEL LT 7.0%: CPT | Performed by: PHYSICIAN ASSISTANT

## 2021-11-04 PROCEDURE — G9717 DOC PT DX DEP/BP F/U NT REQ: HCPCS | Performed by: PHYSICIAN ASSISTANT

## 2021-11-04 PROCEDURE — G8427 DOCREV CUR MEDS BY ELIG CLIN: HCPCS | Performed by: PHYSICIAN ASSISTANT

## 2021-11-04 PROCEDURE — 2022F DILAT RTA XM EVC RTNOPTHY: CPT | Performed by: PHYSICIAN ASSISTANT

## 2021-11-04 RX ORDER — FLASH GLUCOSE SENSOR
1 KIT MISCELLANEOUS
Qty: 6 KIT | Refills: 3 | Status: SHIPPED | OUTPATIENT
Start: 2021-11-04 | End: 2022-06-08

## 2021-11-04 RX ORDER — DICYCLOMINE HYDROCHLORIDE 10 MG/1
10 CAPSULE ORAL
Qty: 30 CAPSULE | Refills: 2 | Status: SHIPPED | OUTPATIENT
Start: 2021-11-04

## 2021-11-04 NOTE — PROGRESS NOTES
HISTORY OF PRESENT ILLNESS  Maria E Thornton is a 40 y.o. female. HPI   Pt is being seen via doxy. me for f/u on her DM1, and depression/anxiety. She has been seeing GI for her abd cramping and was dx with gastroparesis. Discussed how uncontrolled diabetes causes gastroparesis and importance of glucose control     Allergies   Allergen Reactions    Amoxicillin Nausea and Vomiting       Current Outpatient Medications   Medication Sig    flash glucose sensor (FreeStyle Janet 2 Sensor) kit 1 Device by Does Not Apply route every fourteen (14) days.  dicyclomine (BENTYL) 10 mg capsule Take 1 Capsule by mouth four (4) times daily as needed for Abdominal Cramps, Cramping or Pain.  fluocinoNIDE (LIDEX) 0.05 % ointment APPLY TO AFFECTED AREA TWICE A DAY    melatonin 3 mg cap capsule Take 1 Capsule by mouth nightly.  flash glucose scanning reader (FreeStyle Janet 14 Day [place]) misc 1 Each by Does Not Apply route every four (4) weeks.  Blood-Glucose Meter,Continuous (Dexcom G6 ) misc Use as directed    Blood-Glucose Sensor (Dexcom G6 Sensor) loy Use as directed    Blood-Glucose Transmitter (Dexcom G6 Transmitter) loy Use as directed    insulin glargine (Basaglar KwikPen U-100 Insulin) 100 unit/mL (3 mL) inpn 32 Units by SubCUTAneous route daily.  insulin lispro (HUMALOG) 100 unit/mL kwikpen Inject 12units subQ with each of 2 largest meals    hydrOXYzine HCL (ATARAX) 50 mg tablet Take 50 mg by mouth.  sertraline HCl (ZOLOFT PO) Take  by mouth.  omeprazole (PRILOSEC) 40 mg capsule Take 1 Cap by mouth daily. No current facility-administered medications for this visit. Review of Systems   Constitutional: Negative. Negative for chills, fever and malaise/fatigue. HENT: Negative. Negative for congestion, ear pain, sore throat and tinnitus. Eyes: Negative. Negative for blurred vision, double vision and photophobia. Respiratory: Negative.   Negative for cough, shortness of breath and wheezing. Cardiovascular: Negative. Negative for chest pain, palpitations and leg swelling. Gastrointestinal: Negative. Negative for abdominal pain, heartburn, nausea and vomiting. Genitourinary: Negative. Negative for dysuria, frequency, hematuria and urgency. Musculoskeletal: Negative. Negative for back pain, joint pain, myalgias and neck pain. Skin: Positive for itching and rash. Neurological: Negative. Negative for dizziness, tingling, tremors and headaches. Psychiatric/Behavioral: Positive for depression (controlled on med) and substance abuse (hx of not current). Negative for hallucinations, memory loss and suicidal ideas. The patient is nervous/anxious (controlled on meds). The patient does not have insomnia. Physical Exam  Constitutional:       General: She is not in acute distress. Appearance: Normal appearance. She is not ill-appearing. HENT:      Head: Normocephalic and atraumatic. Pulmonary:      Effort: No respiratory distress. Neurological:      Mental Status: She is alert and oriented to person, place, and time. Psychiatric:         Mood and Affect: Affect normal. Mood is anxious. Mood is not depressed. Behavior: Behavior normal. Behavior is cooperative. Thought Content: Thought content normal. Thought content is not paranoid. Thought content does not include homicidal or suicidal ideation. Judgment: Judgment normal.         ASSESSMENT and PLAN    ICD-10-CM ICD-9-CM    1. Type 1 diabetes mellitus without complication ([facility])  J95.6 250.01 flash glucose sensor (FreeStyle Janet 2 Sensor) kit      REFERRAL TO GASTROENTEROLOGY   2. Gastroparesis  K31.84 536.3 REFERRAL TO GASTROENTEROLOGY      dicyclomine (BENTYL) 10 mg capsule   3. Abdominal cramping  R10.9 789.00 dicyclomine (BENTYL) 10 mg capsule   4. Insomnia, unspecified type  G47.00 780.52    5. Depression, unspecified depression type  F32. A 311      Follow-up and Dispositions · Return in about 3 months (around 2/4/2022) for follow up, diabetes. Pt expressed understanding of visit summary and plans for any follow ups or referrals as well as any medications prescribed. Seen by synchronous (real-time) audio-video technology via doxy. me on 11/04/2021    The patient is aware that this patient-initiated Telehealth encounter is a billable service, with coverage as determined by his or her insurance carrier. He/She is aware that they may receive a bill and has provided verbal consent to proceed: Yes        I was in the office while conducting this encounter.

## 2021-12-08 NOTE — PATIENT INSTRUCTIONS
Type 1 Diabetes: Care Instructions  Your Care Instructions     Type 1 diabetes is a lifelong disease that develops when the pancreas stops making insulin. The body needs insulin to let sugar (glucose) move from the blood into the body's cells, where it can be used for energy or stored for later use. Without insulin, the sugar cannot get into the cells to do its work. It stays in the blood instead. This can cause high blood sugar levels. A person has diabetes when the blood sugar is too high. Over time, diabetes can lead to diseases of the heart, blood vessels, nerves, kidneys, and eyes. To treat type 1 diabetes, you need insulin. You can give yourself insulin through an insulin pump, an insulin pen, or a syringe (needle). Insulin, exercise, and a healthy diet can help prevent or delay problems from diabetes. With education and support, you will treat diabetes as a part of your lifenot your whole life. Seek support when you need it from your family, friends, and your doctor or other diabetes experts. Follow-up care is a key part of your treatment and safety. Be sure to make and go to all appointments, and call your doctor if you are having problems. It's also a good idea to know your test results and keep a list of the medicines you take. How can you care for yourself at home? · Take your insulin on time and in the right dose. This helps keep your blood sugar steady. Do not stop or change your insulin without talking to your doctor first.  · Check and record your blood sugar as often as directed. These records can help your doctor see how you are doing and adjust your treatment if needed. It is important to keep track of any symptoms you have, such as low blood sugar, and any changes in your activities, diet, or insulin use. · Carbohydratethe body's main source of fuelaffects blood sugar more than any other nutrient. Carbohydrate is in fruits, vegetables, milk, and yogurt.  It also is in breads, cereals, vegetables such as potatoes and corn, and sugary foods such as candy and cakes. Follow your meal plan to know how much carbohydrate to eat at each meal and snack. · Aim for 30 minutes of exercise on most, preferably all, days of the week. Walking is a good choice. You also may want to do other activities, such as running, swimming, cycling, or playing tennis or team sports. Try to do muscle-strengthening exercises at least 2 times a week. · Control your cholesterol and blood pressure. Exercise and healthy eating can help with these goals. If you have medicine for cholesterol or high blood pressure, take it as directed. Do not stop or change a medicine without talking to your doctor first.  · If you have discussed it with your doctor, take a low-dose aspirin every day to help prevent heart attack and stroke. Do not start taking aspirin unless your doctor knows about it. · Do not smoke. If you need help quitting, talk to your doctor about stop-smoking programs and medicines. These can increase your chances of quitting for good. · Check your feet daily for blisters, cracks, and sores. Have your doctor look at your feet whenever you have a checkup. · Get a checkup every 3 to 6 months. Your doctor will tell you how often to come in. You will need regular tests such as:  ? A hemoglobin A1c test. You may need this test more often than once a year. It is a good measure of how well your treatment is working. ? A cholesterol test.  ? A urine test for protein. This checks for kidney problems. ? A complete foot exam.  ? An eye exam, even if you do not think your vision has changed. When should you call for help? Call 911 anytime you think you may need emergency care. For example, call if:    · You passed out (lost consciousness), or you suddenly become very sleepy or confused. (You may have very low blood sugar.)     · You have symptoms of high blood sugar, such as:  ? Blurred vision. ?  Trouble staying awake or being woken up. ? Fast, deep breathing. ? Breath that smells fruity. ? Belly pain, not feeling hungry, and vomiting. ? Feeling confused. Call your doctor now or seek immediate medical care if:    · Your blood sugar stays higher than the level your doctor has set for you.     · You have symptoms of low blood sugar, such as:  ? Sweating. ? Feeling nervous, shaky, and weak. ? Extreme hunger and slight nausea. ? Dizziness and headache.  ? Blurred vision. ? Confusion. Watch closely for changes in your health, and be sure to contact your doctor if:    · You often have problems controlling your blood sugar.     · You have symptoms of long-term diabetes problems, such as:  ? New vision changes. ? New pain, numbness, or tingling in your hands or feet. ? Skin problems. Where can you learn more? Go to http://www.gray.com/  Enter P859 in the search box to learn more about \"Type 1 Diabetes: Care Instructions. \"  Current as of: August 31, 2020               Content Version: 13.0  © 2006-2021 [employer]. Care instructions adapted under license by [employer] (which disclaims liability or warranty for this information). If you have questions about a medical condition or this instruction, always ask your healthcare professional. [address] any warranty or liability for your use of this information.

## 2022-01-27 ENCOUNTER — VIRTUAL VISIT (OUTPATIENT)
Dept: FAMILY MEDICINE CLINIC | Age: 47
End: 2022-01-27
Payer: MEDICARE

## 2022-01-27 DIAGNOSIS — L02.412 CUTANEOUS ABSCESS OF LEFT AXILLA: ICD-10-CM

## 2022-01-27 DIAGNOSIS — Z02.89 ENCOUNTER FOR COMPLETION OF FORM WITH PATIENT: ICD-10-CM

## 2022-01-27 DIAGNOSIS — E10.9 TYPE 1 DIABETES MELLITUS WITHOUT COMPLICATION (HCC): Primary | ICD-10-CM

## 2022-01-27 PROCEDURE — 99214 OFFICE O/P EST MOD 30 MIN: CPT | Performed by: PHYSICIAN ASSISTANT

## 2022-01-27 PROCEDURE — 2022F DILAT RTA XM EVC RTNOPTHY: CPT | Performed by: PHYSICIAN ASSISTANT

## 2022-01-27 PROCEDURE — G8427 DOCREV CUR MEDS BY ELIG CLIN: HCPCS | Performed by: PHYSICIAN ASSISTANT

## 2022-01-27 PROCEDURE — 3046F HEMOGLOBIN A1C LEVEL >9.0%: CPT | Performed by: PHYSICIAN ASSISTANT

## 2022-01-27 PROCEDURE — G9717 DOC PT DX DEP/BP F/U NT REQ: HCPCS | Performed by: PHYSICIAN ASSISTANT

## 2022-01-27 RX ORDER — SULFAMETHOXAZOLE AND TRIMETHOPRIM 800; 160 MG/1; MG/1
1 TABLET ORAL 2 TIMES DAILY
Qty: 20 TABLET | Refills: 0 | Status: SHIPPED | OUTPATIENT
Start: 2022-01-27 | End: 2022-02-06

## 2022-02-22 LAB — HBA1C MFR BLD HPLC: 6 %

## 2022-03-10 NOTE — PATIENT INSTRUCTIONS
Type 1 Diabetes: Care Instructions  Your Care Instructions     Type 1 diabetes is a lifelong disease that develops when the pancreas stops making insulin. The body needs insulin to let sugar (glucose) move from the blood into the body's cells, where it can be used for energy or stored for later use. Without insulin, the sugar cannot get into the cells to do its work. It stays in the blood instead. This can cause high blood sugar levels. A person has diabetes when the blood sugar is too high. Over time, diabetes can lead to diseases of the heart, blood vessels, nerves, kidneys, and eyes. To treat type 1 diabetes, you need insulin. You can give yourself insulin through an insulin pump, an insulin pen, or a syringe (needle). Insulin, exercise, and a healthy diet can help prevent or delay problems from diabetes. With education and support, you will treat diabetes as a part of your lifenot your whole life. Seek support when you need it from your family, friends, and your doctor or other diabetes experts. Follow-up care is a key part of your treatment and safety. Be sure to make and go to all appointments, and call your doctor if you are having problems. It's also a good idea to know your test results and keep a list of the medicines you take. How can you care for yourself at home? · Take your insulin on time and in the right dose. This helps keep your blood sugar steady. Do not stop or change your insulin without talking to your doctor first.  · Check and record your blood sugar as often as directed. These records can help your doctor see how you are doing and adjust your treatment if needed. It is important to keep track of any symptoms you have, such as low blood sugar, and any changes in your activities, diet, or insulin use. · Carbohydratethe body's main source of fuelaffects blood sugar more than any other nutrient. Carbohydrate is in fruits, vegetables, milk, and yogurt.  It also is in breads, cereals, vegetables such as potatoes and corn, and sugary foods such as candy and cakes. Follow your meal plan to know how much carbohydrate to eat at each meal and snack. · Aim for 30 minutes of exercise on most, preferably all, days of the week. Walking is a good choice. You also may want to do other activities, such as running, swimming, cycling, or playing tennis or team sports. Try to do muscle-strengthening exercises at least 2 times a week. · Control your cholesterol and blood pressure. Exercise and healthy eating can help with these goals. If you have medicine for cholesterol or high blood pressure, take it as directed. Do not stop or change a medicine without talking to your doctor first.  · If you have discussed it with your doctor, take a low-dose aspirin every day to help prevent heart attack and stroke. Do not start taking aspirin unless your doctor knows about it. · Do not smoke. If you need help quitting, talk to your doctor about stop-smoking programs and medicines. These can increase your chances of quitting for good. · Check your feet daily for blisters, cracks, and sores. Have your doctor look at your feet whenever you have a checkup. · Get a checkup every 3 to 6 months. Your doctor will tell you how often to come in. You will need regular tests such as:  ? A hemoglobin A1c test. You may need this test more often than once a year. It is a good measure of how well your treatment is working. ? A cholesterol test.  ? A urine test for protein. This checks for kidney problems. ? A complete foot exam.  ? An eye exam, even if you do not think your vision has changed. When should you call for help? Call 911 anytime you think you may need emergency care. For example, call if:    · You passed out (lost consciousness), or you suddenly become very sleepy or confused. (You may have very low blood sugar.)     · You have symptoms of high blood sugar, such as:  ? Blurred vision. ?  Trouble staying awake or being woken up. ? Fast, deep breathing. ? Breath that smells fruity. ? Belly pain, not feeling hungry, and vomiting. ? Feeling confused. Call your doctor now or seek immediate medical care if:    · Your blood sugar stays higher than the level your doctor has set for you.     · You have symptoms of low blood sugar, such as:  ? Sweating. ? Feeling nervous, shaky, and weak. ? Extreme hunger and slight nausea. ? Dizziness and headache.  ? Blurred vision. ? Confusion. Watch closely for changes in your health, and be sure to contact your doctor if:    · You often have problems controlling your blood sugar.     · You have symptoms of long-term diabetes problems, such as:  ? New vision changes. ? New pain, numbness, or tingling in your hands or feet. ? Skin problems. Where can you learn more? Go to http://www.gray.com/  Enter P859 in the search box to learn more about \"Type 1 Diabetes: Care Instructions. \"  Current as of: July 28, 2021               Content Version: 13.2  © 2006-2022 [employer]. Care instructions adapted under license by [employer] (which disclaims liability or warranty for this information). If you have questions about a medical condition or this instruction, always ask your healthcare professional. [address] any warranty or liability for your use of this information.

## 2022-03-10 NOTE — PROGRESS NOTES
HISTORY OF PRESENT ILLNESS  Celestina Costello is a 40 y.o. female. HPI   [facility] disability accomodation paperwork to allow her more frequent breaks due to her type 1 diabetes and having to check her BS more often. She also has an infection under her left arm. She has been told in the past she has hidradenitis but is not sure. She gets infections like this every now and then and usually abx work and she does not need I and D. Denies fever, increase in BS, drainage, and it is not spreading. Allergies   Allergen Reactions    Amoxicillin Nausea and Vomiting       Current Outpatient Medications   Medication Sig    metoclopramide HCl (REGLAN) 10 mg tablet Take 10 mg by mouth Before breakfast, lunch, dinner and at bedtime.  flash glucose sensor (FreeStyle Janet 2 Sensor) kit 1 Device by Does Not Apply route every fourteen (14) days.  dicyclomine (BENTYL) 10 mg capsule Take 1 Capsule by mouth four (4) times daily as needed for Abdominal Cramps, Cramping or Pain.  fluocinoNIDE (LIDEX) 0.05 % ointment APPLY TO AFFECTED AREA TWICE A DAY    melatonin 3 mg cap capsule Take 1 Capsule by mouth nightly.  flash glucose scanning reader (FreeStyle Janet 14 Day [place]) misc 1 Each by Does Not Apply route every four (4) weeks.  Blood-Glucose Meter,Continuous (Dexcom G6 ) misc Use as directed    Blood-Glucose Sensor (Dexcom G6 Sensor) loy Use as directed    Blood-Glucose Transmitter (Dexcom G6 Transmitter) loy Use as directed    insulin glargine (Basaglar KwikPen U-100 Insulin) 100 unit/mL (3 mL) inpn 32 Units by SubCUTAneous route daily.  insulin lispro (HUMALOG) 100 unit/mL kwikpen Inject 12units subQ with each of 2 largest meals    hydrOXYzine HCL (ATARAX) 50 mg tablet Take 50 mg by mouth.  sertraline HCl (ZOLOFT PO) Take  by mouth.  omeprazole (PRILOSEC) 40 mg capsule Take 1 Cap by mouth daily. No current facility-administered medications for this visit. Review of Systems   Constitutional: Negative. Negative for chills, fever and malaise/fatigue. HENT: Negative. Negative for congestion, ear pain, sore throat and tinnitus. Eyes: Negative. Negative for blurred vision, double vision and photophobia. Respiratory: Negative. Negative for cough, shortness of breath and wheezing. Cardiovascular: Negative. Negative for chest pain, palpitations and leg swelling. Gastrointestinal: Negative. Negative for abdominal pain, heartburn, nausea and vomiting. Genitourinary: Positive for frequency. Negative for dysuria, hematuria and urgency. Musculoskeletal: Negative. Negative for back pain, joint pain, myalgias and neck pain. Skin: Negative for itching and rash. Abscess in armpit   Neurological: Negative. Negative for dizziness, tingling, tremors and headaches. Psychiatric/Behavioral: Positive for depression (controlled on med) and substance abuse (hx of not current). Negative for hallucinations, memory loss and suicidal ideas. The patient is nervous/anxious (controlled on meds). The patient does not have insomnia. Physical Exam  Constitutional:       General: She is not in acute distress. Appearance: Normal appearance. She is not ill-appearing. HENT:      Head: Normocephalic and atraumatic. Pulmonary:      Effort: No respiratory distress. Neurological:      Mental Status: She is alert and oriented to person, place, and time. Psychiatric:         Mood and Affect: Affect normal. Mood is anxious. Mood is not depressed. Behavior: Behavior normal. Behavior is cooperative. Thought Content: Thought content normal. Thought content is not paranoid. Thought content does not include homicidal or suicidal ideation. Judgment: Judgment normal.         ASSESSMENT and PLAN    ICD-10-CM ICD-9-CM    1. Type 1 diabetes mellitus without complication ([facility])  T57.1 250.01    2.  Cutaneous abscess of left axilla  L02.412 682.3 trimethoprim-sulfamethoxazole (BACTRIM DS, SEPTRA DS) 160-800 mg per tablet   3. Encounter for completion of form with patient  Z02.89 V68.89      Follow-up and Dispositions    · Return in about 3 months (around 4/27/2022) for diabetes. Pt expressed understanding of visit summary and plans for any follow ups or referrals as well as any medications prescribed. Seen by synchronous (real-time) audio-video technology via doxy. me on 1/27/22    The patient is aware that this patient-initiated Telehealth encounter is a billable service, with coverage as determined by his or her insurance carrier. He/She is aware that they may receive a bill and has provided verbal consent to proceed: Yes        I was in the office while conducting this encounter.

## 2022-03-31 ENCOUNTER — VIRTUAL VISIT (OUTPATIENT)
Dept: FAMILY MEDICINE CLINIC | Age: 47
End: 2022-03-31
Payer: MEDICARE

## 2022-03-31 DIAGNOSIS — Z86.39: ICD-10-CM

## 2022-03-31 DIAGNOSIS — E10.9 TYPE 1 DIABETES MELLITUS WITHOUT COMPLICATION (HCC): ICD-10-CM

## 2022-03-31 DIAGNOSIS — M62.838 MUSCLE SPASTICITY: Primary | ICD-10-CM

## 2022-03-31 DIAGNOSIS — K31.84 GASTROPARESIS: ICD-10-CM

## 2022-03-31 DIAGNOSIS — R11.11 NON-INTRACTABLE VOMITING WITHOUT NAUSEA, UNSPECIFIED VOMITING TYPE: ICD-10-CM

## 2022-03-31 PROCEDURE — 3046F HEMOGLOBIN A1C LEVEL >9.0%: CPT | Performed by: PHYSICIAN ASSISTANT

## 2022-03-31 PROCEDURE — 2022F DILAT RTA XM EVC RTNOPTHY: CPT | Performed by: PHYSICIAN ASSISTANT

## 2022-03-31 PROCEDURE — G8427 DOCREV CUR MEDS BY ELIG CLIN: HCPCS | Performed by: PHYSICIAN ASSISTANT

## 2022-03-31 PROCEDURE — 99214 OFFICE O/P EST MOD 30 MIN: CPT | Performed by: PHYSICIAN ASSISTANT

## 2022-03-31 PROCEDURE — G9717 DOC PT DX DEP/BP F/U NT REQ: HCPCS | Performed by: PHYSICIAN ASSISTANT

## 2022-03-31 RX ORDER — INSULIN PUMP CONTROLLER
EACH MISCELLANEOUS
COMMUNITY
Start: 2022-03-22 | End: 2022-10-21

## 2022-03-31 RX ORDER — CYCLOBENZAPRINE HCL 10 MG
10 TABLET ORAL
Qty: 30 TABLET | Refills: 0 | Status: SHIPPED | OUTPATIENT
Start: 2022-03-31 | End: 2022-05-06 | Stop reason: SDUPTHER

## 2022-03-31 RX ORDER — GLUCAGON 1 MG
VIAL (EA) INJECTION
COMMUNITY
Start: 2022-03-16 | End: 2022-10-21

## 2022-03-31 RX ORDER — ONDANSETRON 4 MG/1
TABLET, ORALLY DISINTEGRATING ORAL
COMMUNITY
Start: 2022-02-09 | End: 2022-10-21

## 2022-03-31 NOTE — PROGRESS NOTES
HISTORY OF PRESENT ILLNESS  Huber Ahmadi is a 40 y.o. female. HPI  Pt is being seen via doxy. me and is crying profusely and states she is in extreme pain on left side of her entire body. Pt is on video writhing in pain and saying she cannot take it. At one point she put the phone down to vomit. She has been in ER mult times for DKA and advised her she needs to go again as that is likely what is going on. She states her BS has been good yet her glucometer broke today so she cannot tell me what her BS is now. She is refusing to go to the ER and has endo appt in person at 1 today and does not want to miss it. Advised her she needed to go as DKA is life threatening. She is requesting pain man referral and pain meds to help her through these episodes. Advised her pain meds were not the answer and that pain man does not treat things like this as there is no diagnosis for the pain other than DKA and endo would be the one to help her get her diabetes better controlled. She is adamant about referral and states that her GI and endo both told her that is what she needed. Advised her I would place referral but they may deny seeing her. Tried to continue to advise her to go the ER but she will not and states she will be at endo at 1. Will also refer her to neuro as she states the muscle spasms are always there to some degree. Allergies   Allergen Reactions    Amoxicillin Nausea and Vomiting       Current Outpatient Medications   Medication Sig    cyclobenzaprine (FLEXERIL) 10 mg tablet Take 1 Tablet by mouth three (3) times daily as needed for Muscle Spasm(s).  therapeutic multivitamin-minerals (THERAGRAN-M) tablet Take 1 Tablet by mouth daily.  Glucagon Emergency Kit, human, 1 mg solr INJECT 1 MG INTO THE MUSCLE ONCE FOR 1 DOSE.  Omnipod Dash Insulin Pod crtg INJECT 1 EACH BENEATH THE SKIN EVERY 48 HOURS.     ondansetron (ZOFRAN ODT) 4 mg disintegrating tablet ALLOW 1 TABLET TO DISSOLVE ON TONGUE EVERY 8 HOURS AS NEEDED FOR NAUSEA/VOMITING.  metoclopramide HCl (REGLAN) 10 mg tablet Take 10 mg by mouth Before breakfast, lunch, dinner and at bedtime.  flash glucose sensor (FreeStyle Janet 2 Sensor) kit 1 Device by Does Not Apply route every fourteen (14) days.  dicyclomine (BENTYL) 10 mg capsule Take 1 Capsule by mouth four (4) times daily as needed for Abdominal Cramps, Cramping or Pain.  fluocinoNIDE (LIDEX) 0.05 % ointment APPLY TO AFFECTED AREA TWICE A DAY    melatonin 3 mg cap capsule Take 1 Capsule by mouth nightly.  flash glucose scanning reader (FreeStyle Janet 14 Day Dickens) misc 1 Each by Does Not Apply route every four (4) weeks.  Blood-Glucose Meter,Continuous (Dexcom G6 ) misc Use as directed    Blood-Glucose Sensor (Dexcom G6 Sensor) loy Use as directed    Blood-Glucose Transmitter (Dexcom G6 Transmitter) loy Use as directed    insulin glargine (Basaglar KwikPen U-100 Insulin) 100 unit/mL (3 mL) inpn 32 Units by SubCUTAneous route daily.  insulin lispro (HUMALOG) 100 unit/mL kwikpen Inject 12units subQ with each of 2 largest meals    hydrOXYzine HCL (ATARAX) 50 mg tablet Take 50 mg by mouth.  sertraline HCl (ZOLOFT PO) Take  by mouth.  omeprazole (PRILOSEC) 40 mg capsule Take 1 Cap by mouth daily. No current facility-administered medications for this visit. Review of Systems   Constitutional: Negative. Negative for chills, fever and malaise/fatigue. HENT: Negative. Negative for congestion, ear pain, sore throat and tinnitus. Eyes: Negative. Negative for blurred vision, double vision and photophobia. Respiratory: Negative. Negative for cough, shortness of breath and wheezing. Cardiovascular: Negative. Negative for chest pain, palpitations and leg swelling. Gastrointestinal: Positive for abdominal pain and vomiting. Negative for heartburn and nausea. Genitourinary: Negative. Negative for dysuria, frequency, hematuria and urgency. Musculoskeletal: Positive for myalgias. Negative for back pain, joint pain and neck pain. Spasms of entire left side   Skin: Negative. Negative for itching and rash. Neurological: Negative. Negative for dizziness, tingling, tremors and headaches. Psychiatric/Behavioral: Positive for depression (controlled on med) and substance abuse (hx of not current). Negative for hallucinations, memory loss and suicidal ideas. The patient is nervous/anxious (controlled on meds). The patient does not have insomnia. Physical Exam  Constitutional:       Appearance: She is ill-appearing and diaphoretic. Comments: Pt sweating profusely and her left arm and leg are outstretched and appear to be spasming. Pt is crying and stating she is in pain and cannot take it. HENT:      Head: Normocephalic and atraumatic. Pulmonary:      Effort: No respiratory distress. Neurological:      Mental Status: She is alert and oriented to person, place, and time. Psychiatric:         Mood and Affect: Mood is anxious and depressed. Thought Content: Thought content normal. Thought content is not paranoid. Thought content does not include homicidal or suicidal ideation. ASSESSMENT and PLAN    ICD-10-CM ICD-9-CM    1. Muscle spasticity  M62.838 728.85 cyclobenzaprine (FLEXERIL) 10 mg tablet      REFERRAL TO NEUROLOGY   2. Type 1 diabetes mellitus without complication (HCC)  I69.2 250.01    3. Gastroparesis  K31.84 536.3    4. Non-intractable vomiting without nausea, unspecified vomiting type  R11.11 787.03    5. Hx of ketoacidosis  Z86.39 V12.29      Follow-up and Dispositions    · Return in about 4 weeks (around 4/28/2022) for follow up. Pt expressed understanding of visit summary and plans for any follow ups or referrals as well as any medications prescribed. Seen by synchronous (real-time) audio-video technology via doxy. me on 3/31/2022    The patient is aware that this patient-initiated Telehealth encounter is a billable service, with coverage as determined by his or her insurance carrier. He/She is aware that they may receive a bill and has provided verbal consent to proceed: Yes        I was in the office while conducting this encounter.

## 2022-03-31 NOTE — PATIENT INSTRUCTIONS
Diabetic Ketoacidosis (DKA): Care Instructions  Overview  Diabetic ketoacidosis (DKA) happens when the body does not have enough insulin and can't get the sugar it needs for energy. When the body can't use sugar for energy, it starts to use fat for energy. This process makes fatty acids called ketones. The ketones build up in the blood and change the chemical balance in your body. This problem can be very dangerous and needs to be treated. Without treatment, it can lead to a coma or death. DKA occurs most often in people with type 1 diabetes. But people with type 2 diabetes also can get it. DKA can be caused by many things. It can happen if you don't take enough insulin. It can also happen if you have an infection or illness like the flu. Sometimes it happens if you are very dehydrated. DKA can only be treated with insulin and fluids. These are often given in a vein (IV). Follow-up care is a key part of your treatment and safety. Be sure to make and go to all appointments, and call your doctor if you are having problems. It's also a good idea to know your test results and keep a list of the medicines you take. How can you care for yourself at home? To reduce your chance of ketoacidosis:  · Take your insulin and other diabetes medicines on time and in the right dose. ? If an infection caused your DKA and your doctor prescribed antibiotics, take them as directed. Do not stop taking them just because you feel better. You need to take the full course of antibiotics. · Test your blood sugar before meals and at bedtime or as often as your doctor advises. This is the best way to know when your blood sugar is high so you can treat it early. Watching for symptoms is not as helpful. This is because you may not have symptoms until your blood sugar is very high. Or you may not notice them. · Teach others at work and at home how to check your blood sugar.  Make sure that someone else knows how do it in case you can't.  · Wear or carry medical identification at all times. This is very important in case you are too sick or injured to speak for yourself. · Talk to your doctor about when you can start to exercise again. · Eat regular meals that spread your calories and carbohydrate throughout the day. This will help keep your blood sugar steady. · When you are sick:  ? Take your insulin and diabetes medicines. This is important even if you are vomiting and having trouble eating or drinking. Your blood sugar may go up because you are sick. If you are eating less than normal, you may need to change your dose of insulin. Talk with your doctor about a plan when you are well. Then you will know what to do when you are sick. ? Drink extra fluids to prevent dehydration. These include water, broth, and sugar-free drinks. If you don't drink enough, the insulin from your shot may not get into your blood. So your blood sugar may go up. ? Try to eat as you normally do, with a focus on healthy food choices. ? Check your blood sugar at least every 3 to 4 hours. Check it more often if it's rising fast. If your doctor has told you to take an extra insulin dose for high blood sugar levels (for example, above 240 mg/dL) be sure to take the right amount. If you're not sure how much to take, call your doctor. ? Check your temperature and pulse often. If your temperature goes up, call your doctor. You may be getting worse. ? If you take insulin, check your urine or blood for ketones, especially when you have high blood sugar (for example, above 240 mg/dL). Call your doctor if your ketone level is moderate or high. If you know your blood sugar is high, treat it before it gets worse. · If you missed your usual dose of insulin or other diabetes medicine, take the missed dose or take the amount your doctor told you to take if this happens. · If you and your doctor decide on a dose of extra-fast-acting insulin, give yourself the right dose. If you take insulin and your doctor has not told you how much fast-acting insulin to take based on your blood sugar level, call your doctor. · Drink extra water or sugar-free drinks to prevent dehydration. · Wait 30 minutes after you take extra insulin or missed medicines. Then check your blood sugar again. · If symptoms of high blood sugar get worse or your blood sugar level keeps rising, call your doctor. If you start to feel sleepy or confused, call 911. When should you call for help? Call 911 anytime you think you may need emergency care. For example, call if:    · You passed out (lost consciousness).     · You are confused or cannot think clearly.     · Your blood sugar is very high or very low. Watch closely for changes in your health, and be sure to contact your doctor if:    · Your blood sugar stays outside the level your doctor set for you.     · You have any problems. Where can you learn more? Go to http://www.gray.com/  Enter [id number] in the search box to learn more about \"Diabetic Ketoacidosis (DKA): Care Instructions. \"  Current as of: July 28, 2021               Content Version: 13.2  © 4956-9528 [employer]. Care instructions adapted under license by [employer] (which disclaims liability or warranty for this information). If you have questions about a medical condition or this instruction, always ask your healthcare professional. [address] any warranty or liability for your use of this information.

## 2022-05-06 ENCOUNTER — TELEPHONE (OUTPATIENT)
Dept: FAMILY MEDICINE CLINIC | Age: 47
End: 2022-05-06

## 2022-05-06 DIAGNOSIS — M62.838 MUSCLE SPASTICITY: ICD-10-CM

## 2022-05-06 DIAGNOSIS — M62.838 MUSCLE SPASTICITY: Primary | ICD-10-CM

## 2022-05-06 RX ORDER — CYCLOBENZAPRINE HCL 10 MG
10 TABLET ORAL
Qty: 30 TABLET | Refills: 0 | Status: SHIPPED | OUTPATIENT
Start: 2022-05-06 | End: 2022-07-01 | Stop reason: SDUPTHER

## 2022-05-06 NOTE — TELEPHONE ENCOUNTER
Requested Prescriptions     Pending Prescriptions Disp Refills    cyclobenzaprine (FLEXERIL) 10 mg tablet 30 Tablet 0     Sig: Take 1 Tablet by mouth three (3) times daily as needed for Muscle Spasm(s). No future appointments.

## 2022-05-12 DIAGNOSIS — E10.9 TYPE 1 DIABETES MELLITUS WITHOUT COMPLICATION (HCC): ICD-10-CM

## 2022-05-13 RX ORDER — FLUOCINONIDE 0.5 MG/G
OINTMENT TOPICAL
Qty: 15 G | Refills: 0 | Status: SHIPPED | OUTPATIENT
Start: 2022-05-13 | End: 2022-10-21

## 2022-05-15 LAB — HBA1C MFR BLD HPLC: 6.8 %

## 2022-05-27 ENCOUNTER — TELEPHONE (OUTPATIENT)
Dept: FAMILY MEDICINE CLINIC | Age: 47
End: 2022-05-27

## 2022-05-27 NOTE — TELEPHONE ENCOUNTER
A provider, by law, is not required to fill out disability paperwork nor is it even required to give a reason why. Specialists can fill it out if they are willing to but often do not because of the time it takes. I will discuss this at pts visit with her.

## 2022-05-27 NOTE — TELEPHONE ENCOUNTER
Patient called agitated about her upcoming appointment 05/31/2022. Stating that \"someone\" from this office called her and canceled her appointment. I informed patient that she has a VV scheduled for 05/31/2022 @ 9am.  She proceeded to state that [employer]" to fill out her disability forms as she feels that she deserves to work part time hours due to her diabetes and multiple health issues and that specialists do not complete this type of paperwork and that her PCP is \"required\" to complete these forms for her.

## 2022-05-31 ENCOUNTER — VIRTUAL VISIT (OUTPATIENT)
Dept: FAMILY MEDICINE CLINIC | Age: 47
End: 2022-05-31
Payer: MEDICARE

## 2022-05-31 DIAGNOSIS — E10.9 TYPE 1 DIABETES MELLITUS WITHOUT COMPLICATION (HCC): ICD-10-CM

## 2022-05-31 DIAGNOSIS — K31.84 GASTROPARESIS: ICD-10-CM

## 2022-05-31 DIAGNOSIS — M62.838 MUSCLE SPASTICITY: ICD-10-CM

## 2022-05-31 DIAGNOSIS — Z02.89 ENCOUNTER FOR COMPLETION OF FORM WITH PATIENT: ICD-10-CM

## 2022-05-31 DIAGNOSIS — Z86.39: ICD-10-CM

## 2022-05-31 DIAGNOSIS — M79.10 MYALGIA: Primary | ICD-10-CM

## 2022-05-31 DIAGNOSIS — Z00.00 ENCOUNTER FOR ANNUAL WELLNESS EXAM IN MEDICARE PATIENT: ICD-10-CM

## 2022-05-31 PROCEDURE — 2022F DILAT RTA XM EVC RTNOPTHY: CPT | Performed by: PHYSICIAN ASSISTANT

## 2022-05-31 PROCEDURE — G8427 DOCREV CUR MEDS BY ELIG CLIN: HCPCS | Performed by: PHYSICIAN ASSISTANT

## 2022-05-31 PROCEDURE — G8421 BMI NOT CALCULATED: HCPCS | Performed by: PHYSICIAN ASSISTANT

## 2022-05-31 PROCEDURE — 3044F HG A1C LEVEL LT 7.0%: CPT | Performed by: PHYSICIAN ASSISTANT

## 2022-05-31 PROCEDURE — G0439 PPPS, SUBSEQ VISIT: HCPCS | Performed by: PHYSICIAN ASSISTANT

## 2022-05-31 PROCEDURE — 99214 OFFICE O/P EST MOD 30 MIN: CPT | Performed by: PHYSICIAN ASSISTANT

## 2022-05-31 PROCEDURE — G9717 DOC PT DX DEP/BP F/U NT REQ: HCPCS | Performed by: PHYSICIAN ASSISTANT

## 2022-06-02 ENCOUNTER — TELEPHONE (OUTPATIENT)
Dept: FAMILY MEDICINE CLINIC | Age: 47
End: 2022-06-02

## 2022-06-02 DIAGNOSIS — M79.10 MYALGIA: ICD-10-CM

## 2022-06-02 NOTE — TELEPHONE ENCOUNTER
Please advise the pt that after receiving endocrinology notes and information from Dr Marcos Sotomayor nurse that it is not a reasonable accomodation to decrease to 21 hrs. They apparently have attempted to schedule her for an insulin pump and states she would need 2-3 classes that are an hour each to be trained. They did say they do not usually fill out disability paperwork but they also stated that Dr Marcos Chakraborty felt her diabetes was controllable if she complied with treatment recommendations and therefore her diabetes was not a reason for disability. I will do paperwork for accomodation for more breaks to test BS and for time off to attend appts. Pain management suggesting fibromyalgia is not a diagnosis so I cannot do that. Her uncontrolled BS is likely causing her muscle pains/spasms bc of how often she has been in ketoacidosis and there is no question it contributes/causes gastroparesis. Everything seems to lead to her controlling her diabetes. I did order rheumatology labs for her as we discussed that she can come to the office and have done.

## 2022-06-02 NOTE — TELEPHONE ENCOUNTER
Spoke with Nicho, Dr Tamica Chance nurse. According to Nicho with Dr Tamica Chance office The office has reached out to patient on 4 different occasions in an attempt to get her scheduled for education on the Omnipod insulin pump, with no response from the patient. The patient is also in need of nutritional counseling which consists of 2-3 classes at approximately an hour each. The nurse also stated that Dr Dylan Nunn instructed the patient to contact her Primary care for completion of Disability forms. The nurse also stated that according to the Endocrinologist her diabetes is controllable by the patient and therefore Diabetes is not a reason for disability in this case.

## 2022-06-03 NOTE — TELEPHONE ENCOUNTER
She wants you to  wait until all of her records are in before making a decision. She states that she is going to have those 20 hours off a week whether you want her to or not. I can barley get a word in before she she ask me to be quite and take over the conversation. She is going to attach some records of up coming appoinments for you to see as well.

## 2022-06-05 DIAGNOSIS — E10.9 TYPE 1 DIABETES MELLITUS WITHOUT COMPLICATION (HCC): ICD-10-CM

## 2022-06-08 RX ORDER — FLASH GLUCOSE SENSOR
KIT MISCELLANEOUS
Qty: 6 KIT | Refills: 3 | Status: SHIPPED | OUTPATIENT
Start: 2022-06-08 | End: 2022-08-04

## 2022-06-10 LAB
CENTROMERE B AB SER-ACNC: <0.2 AI (ref 0–0.9)
CHROMATIN AB SERPL-ACNC: <0.2 AI (ref 0–0.9)
CK SERPL-CCNC: 74 U/L (ref 32–182)
DSDNA AB SER-ACNC: 1 IU/ML (ref 0–9)
ENA JO1 AB SER-ACNC: <0.2 AI (ref 0–0.9)
ENA RNP AB SER-ACNC: 1.4 AI (ref 0–0.9)
ENA SCL70 AB SER-ACNC: <0.2 AI (ref 0–0.9)
ENA SM AB SER-ACNC: <0.2 AI (ref 0–0.9)
ENA SS-A AB SER-ACNC: <0.2 AI (ref 0–0.9)
ENA SS-B AB SER-ACNC: <0.2 AI (ref 0–0.9)
ERYTHROCYTE [SEDIMENTATION RATE] IN BLOOD BY WESTERGREN METHOD: 8 MM/HR (ref 0–32)
RHEUMATOID FACT SERPL-ACNC: <10 IU/ML (ref 0–15)
SEE BELOW, 164869: ABNORMAL

## 2022-06-13 ENCOUNTER — HOSPITAL ENCOUNTER (OUTPATIENT)
Dept: PHYSICAL THERAPY | Age: 47
Discharge: HOME | End: 2022-06-13

## 2022-06-13 ENCOUNTER — TELEPHONE (OUTPATIENT)
Dept: FAMILY MEDICINE CLINIC | Age: 47
End: 2022-06-13

## 2022-06-13 NOTE — PROGRESS NOTES
[address] [facility]   [facility], [address],[place], [address] - Phone: [phone]  Fax: [phone] / [address] [facility]  Patient Name: Feliz Luna : 1975   Medical   Diagnosis: Upper back pain [M54.9]  Other low back pain [M54.59] Treatment Diagnosis: Back pain   Onset Date: na     Referral Source: Shelly La NP Start of Care [facility]): 2022   Prior Hospitalization: See medical history Provider #: [id number]   Prior Level of Function: na   Comorbidities: Diabetes, depression, gastroparesis, PTSD   Medications: Verified on Patient Summary List   The Plan of Care and following information is based on the information from the initial evaluation.   ===========================================================================================  Assessment / leahy information:  Feliz Luna is a 40 y.o. female with Dx: back pain. States \"I don't know why she sent me here. I'm just going through the jumps. Do you do acupuncture? I don't need PT and this isn't going to help with anything. \" Pt denied services and evaluation. Therapist Signature: Shay Szymanski PT , DPT Date:    Certification Period: 22 - 9/10/22 Time: 7:50 AM   ===========================================================================================  I certify that the above Physical Therapy Services are being furnished while the patient is under my care. I agree with the treatment plan and certify that this therapy is necessary. Physician Signature:        Date:       Time:                                        Shelly La NP  Please sign and return to [facility] [place]. or you may fax the signed copy to [phone]. Thank you.

## 2022-06-13 NOTE — TELEPHONE ENCOUNTER
Patient is calling to check on the status of her ADA paperwork. Have you obtained the specialty notes and is the paperwork complete?

## 2022-06-15 ENCOUNTER — PATIENT MESSAGE (OUTPATIENT)
Dept: FAMILY MEDICINE CLINIC | Age: 47
End: 2022-06-15

## 2022-06-15 NOTE — TELEPHONE ENCOUNTER
Patient called the office to enquire about her ADA form and weather or not we have gotten the paper work to be able to complete the form. I tried to explain to her that when I called her on the phone and read her Fortune's note that she was not going to able to fill out the form. She stated that she remember talking to me and she did not want to speak to me because her form was going to be completed. She asked me who was I to tell her that her form was not going to be done any ways. I tried to explain to her that I do not make any decisions, I just read the messages. I asked her to hold on and she kept on raising her voice so that I could not get in a word. She stated that no matter what her form was going to be done. I finally got a chance to tell her that we would send another message to her provider and call back tomorrow when she was in the office.

## 2022-06-16 NOTE — TELEPHONE ENCOUNTER
Left message for patient that Ms. Isaac will fill the paperwork out but cannot recommend going down to 21 hours since Dr. Rebecca Garcia office informed us that she has been non-compliant and that he diabetes can be controlled if the patient is compliant.

## 2022-06-21 ENCOUNTER — TELEPHONE (OUTPATIENT)
Dept: FAMILY MEDICINE CLINIC | Age: 47
End: 2022-06-21

## 2022-06-21 DIAGNOSIS — R76.8 ANTI-RNP ANTIBODIES PRESENT: Primary | ICD-10-CM

## 2022-06-21 NOTE — TELEPHONE ENCOUNTER
Please advise pt one of her autoimmune labs was elevated (RNA Abs) so I have put in a referral to rheumatology.

## 2022-06-22 ENCOUNTER — PATIENT MESSAGE (OUTPATIENT)
Dept: FAMILY MEDICINE CLINIC | Age: 47
End: 2022-06-22

## 2022-06-27 ENCOUNTER — VIRTUAL VISIT (OUTPATIENT)
Dept: FAMILY MEDICINE CLINIC | Age: 47
End: 2022-06-27
Payer: MEDICARE

## 2022-06-27 DIAGNOSIS — M62.838 MUSCLE SPASTICITY: ICD-10-CM

## 2022-06-27 DIAGNOSIS — F32.A DEPRESSION, UNSPECIFIED DEPRESSION TYPE: ICD-10-CM

## 2022-06-27 DIAGNOSIS — K31.84 GASTROPARESIS: ICD-10-CM

## 2022-06-27 DIAGNOSIS — Z02.89 ENCOUNTER FOR COMPLETION OF FORM WITH PATIENT: ICD-10-CM

## 2022-06-27 DIAGNOSIS — E10.9 TYPE 1 DIABETES MELLITUS WITHOUT COMPLICATION (HCC): Primary | ICD-10-CM

## 2022-06-27 PROCEDURE — G8427 DOCREV CUR MEDS BY ELIG CLIN: HCPCS | Performed by: PHYSICIAN ASSISTANT

## 2022-06-27 PROCEDURE — 3044F HG A1C LEVEL LT 7.0%: CPT | Performed by: PHYSICIAN ASSISTANT

## 2022-06-27 PROCEDURE — 2022F DILAT RTA XM EVC RTNOPTHY: CPT | Performed by: PHYSICIAN ASSISTANT

## 2022-06-27 PROCEDURE — G9717 DOC PT DX DEP/BP F/U NT REQ: HCPCS | Performed by: PHYSICIAN ASSISTANT

## 2022-06-27 PROCEDURE — 99214 OFFICE O/P EST MOD 30 MIN: CPT | Performed by: PHYSICIAN ASSISTANT

## 2022-06-30 ENCOUNTER — PATIENT MESSAGE (OUTPATIENT)
Dept: FAMILY MEDICINE CLINIC | Age: 47
End: 2022-06-30

## 2022-06-30 DIAGNOSIS — M62.838 MUSCLE SPASTICITY: ICD-10-CM

## 2022-06-30 DIAGNOSIS — M79.10 MYALGIA: Primary | ICD-10-CM

## 2022-07-01 RX ORDER — OXYCODONE AND ACETAMINOPHEN 5; 325 MG/1; MG/1
1 TABLET ORAL
Qty: 21 TABLET | Refills: 0 | Status: SHIPPED | OUTPATIENT
Start: 2022-07-01 | End: 2022-07-08

## 2022-07-01 RX ORDER — CYCLOBENZAPRINE HCL 10 MG
10 TABLET ORAL
Qty: 30 TABLET | Refills: 2 | Status: SHIPPED | OUTPATIENT
Start: 2022-07-01

## 2022-07-01 NOTE — TELEPHONE ENCOUNTER
----- Message from Royal Alexander LPN sent at 7/39/5225  9:23 AM EDT -----  Regarding: FW: Prescriptions for pain     ----- Message -----  From: Dmitry Jacobo  Sent: 6/30/2022   8:45 AM EDT  To: [facility]  Subject: Prescriptions for pain                           Can I please get a refill for both cyclobenzapri - which i take almost daily for muscle pain. also please can  you do a small refill for the ocycodone acetaminopine 5-325 which i thankfully got from hospital doctor  in May. I have 2 left of the 24 prescribed and it has helped me not have to go to hospital with the pain issues and I only take as severe needed times.

## 2022-07-03 NOTE — PATIENT INSTRUCTIONS
Schedule of Personalized Health Plan  (Provide Copy to Patient)  The best way to stay healthy is to live a healthy lifestyle. A healthy lifestyle includes regular exercise, eating a well-balanced diet, keeping a healthy weight and not smoking. Regular physical exams and screening tests are another important way to take care of yourself. Preventive exams provided by health care providers can find health problems early when treatment works best and can keep you from getting certain diseases or illnesses. Preventive services include exams, lab tests, screenings, shots, monitoring and information to help you take care of your own health. All people over 65 should have a pneumonia shot. Pneumonia shots are usually only needed once in a lifetime unless your doctor decides differently. All people over 65 should have a yearly flu shot. People over 65 are at medium to high risk for Hepatitis B. Three shots are needed for complete protection. In addition to your physical exam, some screening tests are recommended:    Bone mass measurement (dexa scan) is recommended every two years  Diabetes Mellitus screening is recommended every year. Glaucoma is an eye disease caused by high pressure in the eye. An eye exam is recommended every year. Cardiovascular screening tests that check your cholesterol and other blood fat (lipid) levels are recommended every five years. Colorectal Cancer screening tests help to find pre-cancerous polyps (growths in the colon) so they can be removed before they turn into cancer. Tests ordered for screening depend on your personal and family history risk factors.     Screening for Breast Cancer is recommended yearly with a mammogram.    Screening for Cervical Cancer is recommended every two years (annually for certain risk factors, such as previous history of STD or abnormal PAP in past 7 years), with a Pelvic Exam with PAP    Here is a list of your current Health Maintenance items with a due date:  Health Maintenance   Topic Date Due    Hepatitis C Screening  Never done    COVID-19 Vaccine (1) Never done    Pneumococcal 0-64 years (1 - PCV) Never done    Foot Exam Q1  Never done    Eye Exam Retinal or Dilated  Never done    DTaP/Tdap/Td series (1 - Tdap) Never done    Colorectal Cancer Screening Combo  Never done    Lipid Screen  10/24/2021    MICROALBUMIN Q1  05/24/2022    Flu Vaccine (1) 09/01/2022    A1C test (Diabetic or Prediabetic)  02/22/2023    Medicare Yearly Exam  06/01/2023    Depression Monitoring  06/27/2023    Cervical cancer screen  08/31/2025

## 2022-07-03 NOTE — PROGRESS NOTES
HISTORY OF PRESENT ILLNESS  Janet Manley is a 40 y.o. female. HPI   Pt is being seen via doxy. me to discuss disability paperwork. Previously filled out paperwork giving her extra breaks to test her BS as well as day off for follow ups and incase hospitalized. She is trying to going part time. Advised her that her specialist should fill it out since they are treating her. She advised me that they will not and that her PCP has always done it. Advised her that if her diabetes were controlled then all her other health issues would improve. She states she has been unable to get the pump bc she cannot do the classes for it bc of time constraints. Will contact endo and see what their opinion is. Pt very upset and stated that \"I have to till out her forms\". Advised her I actually do not have to but I am willing to give her reasonable accommodations for her diabetes like previously and even FMLA so she can attend her classes for an insulin pump. She also wanted to let me know her pain man mentioned she likely has fibromyalgia but she has never had a workup for it. Will order autoimmune labs and refer her to rheumatology. Will also call endo for further info. She is lastly due for her wellness. Allergies   Allergen Reactions    Amoxicillin Nausea and Vomiting       Current Outpatient Medications   Medication Sig    cyclobenzaprine (FLEXERIL) 10 mg tablet Take 1 Tablet by mouth three (3) times daily as needed for Muscle Spasm(s).  oxyCODONE-acetaminophen (PERCOCET) 5-325 mg per tablet Take 1 Tablet by mouth every eight (8) hours as needed for Pain for up to 7 days. Max Daily Amount: 3 Tablets.  FreeStyle Janet 2 Sensor kit APPLY 1 SENSOR ONTO THE SKIN EVERY 14 DAYS TO MEASURE BLOOD SUGAR    fluocinoNIDE (LIDEX) 0.05 % ointment APPLY TO AFFECTED AREA TWICE A DAY    Glucagon Emergency Kit, human, 1 mg solr INJECT 1 MG INTO THE MUSCLE ONCE FOR 1 DOSE.     Omnipod Dash Insulin Pod crtg INJECT 1 EACH BENEATH THE SKIN EVERY 48 HOURS.  therapeutic multivitamin-minerals (THERAGRAN-M) tablet Take 1 Tablet by mouth daily.  ondansetron (ZOFRAN ODT) 4 mg disintegrating tablet ALLOW 1 TABLET TO DISSOLVE ON TONGUE EVERY 8 HOURS AS NEEDED FOR NAUSEA/VOMITING.  metoclopramide HCl (REGLAN) 10 mg tablet Take 10 mg by mouth Before breakfast, lunch, dinner and at bedtime.  dicyclomine (BENTYL) 10 mg capsule Take 1 Capsule by mouth four (4) times daily as needed for Abdominal Cramps, Cramping or Pain.  melatonin 3 mg cap capsule Take 1 Capsule by mouth nightly.  flash glucose scanning reader ([employer] Janet 14 Day [place]) misc 1 Each by Does Not Apply route every four (4) weeks.  Blood-Glucose Meter,Continuous (Dexcom G6 ) misc Use as directed    Blood-Glucose Sensor (Dexcom G6 Sensor) loy Use as directed    Blood-Glucose Transmitter (Dexcom G6 Transmitter) loy Use as directed    insulin glargine (Basaglar KwikPen U-100 Insulin) 100 unit/mL (3 mL) inpn 32 Units by SubCUTAneous route daily.  insulin lispro (HUMALOG) 100 unit/mL kwikpen Inject 12units subQ with each of 2 largest meals    hydrOXYzine HCL (ATARAX) 50 mg tablet Take 50 mg by mouth.  sertraline HCl (ZOLOFT PO) Take  by mouth.  omeprazole (PRILOSEC) 40 mg capsule Take 1 Cap by mouth daily. No current facility-administered medications for this visit. Review of Systems   Constitutional: Negative. Negative for chills, fever and malaise/fatigue. HENT: Negative. Negative for congestion, ear pain, sore throat and tinnitus. Eyes: Negative. Negative for blurred vision, double vision and photophobia. Respiratory: Negative. Negative for cough, shortness of breath and wheezing. Cardiovascular: Negative. Negative for chest pain, palpitations and leg swelling. Gastrointestinal: Positive for abdominal pain (recurrent from gastroparesis) and nausea (chronoic). Negative for heartburn and vomiting.    Genitourinary: Negative. Negative for dysuria, frequency, hematuria and urgency. Musculoskeletal: Positive for myalgias (chronic). Negative for back pain, joint pain and neck pain. Spasms of entire left side   Skin: Negative. Negative for itching and rash. Neurological: Negative. Negative for dizziness, tingling, tremors and headaches. Psychiatric/Behavioral: Positive for depression (controlled on med) and substance abuse (hx of not current). Negative for hallucinations, memory loss and suicidal ideas. The patient is nervous/anxious (controlled on meds). The patient does not have insomnia. Physical Exam  Constitutional:       Appearance: She is not ill-appearing or diaphoretic. Comments:     HENT:      Head: Normocephalic and atraumatic. Pulmonary:      Effort: No respiratory distress. Neurological:      Mental Status: She is alert and oriented to person, place, and time. Psychiatric:         Mood and Affect: Mood is anxious and depressed. Thought Content: Thought content normal. Thought content is not paranoid. Thought content does not include homicidal or suicidal ideation. ASSESSMENT and PLAN    ICD-10-CM ICD-9-CM    1. Myalgia  M79.10 729.1 CK      SED RATE (ESR)      RHEUMATOID FACTOR, QT      STEPHEN COMPREHENSIVE PANEL   2. Type 1 diabetes mellitus without complication (HCC)  O66.1 250.01    3. Hx of ketoacidosis  Z86.39 V12.29    4. Muscle spasticity  M62.838 728.85    5. Gastroparesis  K31.84 536.3    6. Encounter for completion of form with patient  Z02.89 V68.89    7. Encounter for annual wellness exam in Medicare patient  Z00.00 V70.0      Follow-up and Dispositions    · Return in about 3 months (around 8/31/2022) for follow up. Pt expressed understanding of visit summary and plans for any follow ups or referrals as well as any medications prescribed. Emperatriz Madsen, was evaluated through a synchronous (real-time) audio-video encounter.  The patient (or guardian if applicable) is aware that this is a billable service, which includes applicable co-pays. This Virtual Visit was conducted with patient's (and/or legal guardian's) consent. The visit was conducted pursuant to the emergency declaration under the [facility], [facility] authority and the [employer] General Act. Patient identification was verified, and a caregiver was present when appropriate. The patient was located at: Home: [address] [place] [phone]  The provider was located at: Facility (Appt Department): [address] [address] [phone]                                                  Medicare Wellness Visit  Cherie Marcano is a 40 y.o. female and presents for annual Medicare Wellness Visit. Problem List: Reviewed with patient and discussed risk factors. Patient Active Problem List   Diagnosis Code    Depression F32. A       Current medical providers:  Patient Care Team:  Maurisio Werner PA-C as PCP - General (Physician Assistant)  Maurisio Werner PA-C as PCP - [facility] Provider    PSH: Reviewed with patient  Past Surgical History:   Procedure Laterality Date    HX APPENDECTOMY          SH: Reviewed with patient  Social History     Tobacco Use    Smoking status: Former Smoker     Years: 5.00    Smokeless tobacco: Never Used   Vaping Use    Vaping Use: Never used   Substance Use Topics    Alcohol use:  Yes    Drug use: Not on file       FH: Reviewed with patient  Family History   Problem Relation Age of Onset    Cancer Mother     Diabetes Mother     Diabetes Brother     Cancer Maternal Grandfather        Medications/Allergies: Reviewed with patient  Current Outpatient Medications on File Prior to Visit   Medication Sig Dispense Refill    fluocinoNIDE (LIDEX) 0.05 % ointment APPLY TO AFFECTED AREA TWICE A DAY 15 g 0    Glucagon Emergency Kit, human, 1 mg solr INJECT 1 MG INTO THE MUSCLE ONCE FOR 1 DOSE.  Omnipod Dash Insulin Pod crtg INJECT 1 EACH BENEATH THE SKIN EVERY 48 HOURS.  therapeutic multivitamin-minerals (THERAGRAN-M) tablet Take 1 Tablet by mouth daily.  ondansetron (ZOFRAN ODT) 4 mg disintegrating tablet ALLOW 1 TABLET TO DISSOLVE ON TONGUE EVERY 8 HOURS AS NEEDED FOR NAUSEA/VOMITING.  metoclopramide HCl (REGLAN) 10 mg tablet Take 10 mg by mouth Before breakfast, lunch, dinner and at bedtime.  dicyclomine (BENTYL) 10 mg capsule Take 1 Capsule by mouth four (4) times daily as needed for Abdominal Cramps, Cramping or Pain. 30 Capsule 2    melatonin 3 mg cap capsule Take 1 Capsule by mouth nightly. 30 Capsule 2    flash glucose scanning reader ([employer] Janet 14 Day [place]) misc 1 Each by Does Not Apply route every four (4) weeks. 1 Each 3    Blood-Glucose Meter,Continuous (Dexcom G6 ) misc Use as directed 1 Each 0    Blood-Glucose Sensor (Dexcom G6 Sensor) loy Use as directed 10 Device 3    Blood-Glucose Transmitter (Dexcom G6 Transmitter) loy Use as directed 1 Device 3    insulin glargine (Basaglar KwikPen U-100 Insulin) 100 unit/mL (3 mL) inpn 32 Units by SubCUTAneous route daily. 10 Pen 3    insulin lispro (HUMALOG) 100 unit/mL kwikpen Inject 12units subQ with each of 2 largest meals 10 Pen 3    hydrOXYzine HCL (ATARAX) 50 mg tablet Take 50 mg by mouth.  sertraline HCl (ZOLOFT PO) Take  by mouth.  omeprazole (PRILOSEC) 40 mg capsule Take 1 Cap by mouth daily. 5 Cap 0     No current facility-administered medications on file prior to visit. Allergies   Allergen Reactions    Amoxicillin Nausea and Vomiting       Objective: There were no vitals taken for this visit. There is no height or weight on file to calculate BMI.     Assessment of cognitive impairment: Alert and oriented x 3    Depression Screen:   3 most recent PHQ Screens 6/27/2022   PHQ Not Done -   Little interest or pleasure in doing things Several days   Feeling down, depressed, irritable, or hopeless Several days   Total Score PHQ 2 2       Fall Risk Assessment:  No flowsheet data found. Functional Ability:   Does the patient exhibit a steady gait? yes   How long did it take the patient to get up and walk from a sitting position? <10sec   Is the patient self reliant?  (ie can do own laundry, meals, household chores)  yes     Does the patient handle his/her own medications? yes     Does the patient handle his/her own money? yes     Is the patients home safe (ie good lighting, handrails on stairs and bath, etc.)? yes     Did you notice or did patient express any hearing difficulties? no     Did you notice or did patient express any vision difficulties?   no     Were distance and reading eye charts used? no       Advance Care Planning:   Patient was offered the opportunity to discuss advance care planning:  yes     Does patient have an Advance Directive:  no   If no, did you provide information on Caring Connections? yes       Plan:      Orders Placed This Encounter    CK    SED RATE (ESR)    RHEUMATOID FACTOR, QT    STEPHEN COMPREHENSIVE PANEL       Health Maintenance   Topic Date Due    Hepatitis C Screening  Never done    COVID-19 Vaccine (1) Never done    Pneumococcal 0-64 years (1 - PCV) Never done    Foot Exam Q1  Never done    Eye Exam Retinal or Dilated  Never done    DTaP/Tdap/Td series (1 - Tdap) Never done    Colorectal Cancer Screening Combo  Never done    Lipid Screen  10/24/2021    MICROALBUMIN Q1  05/24/2022    Flu Vaccine (1) 09/01/2022    A1C test (Diabetic or Prediabetic)  02/22/2023    Medicare Yearly Exam  06/01/2023    Depression Monitoring  06/27/2023    Cervical cancer screen  08/31/2025       *Patient verbalized understanding and agreement with the plan. A copy of the After Visit Summary with personalized health plan was given to the patient today.

## 2022-07-21 ENCOUNTER — OFFICE VISIT (OUTPATIENT)
Dept: FAMILY MEDICINE CLINIC | Age: 47
End: 2022-07-21
Payer: MEDICARE

## 2022-07-21 VITALS
BODY MASS INDEX: 22.36 KG/M2 | DIASTOLIC BLOOD PRESSURE: 77 MMHG | TEMPERATURE: 97 F | SYSTOLIC BLOOD PRESSURE: 117 MMHG | WEIGHT: 131 LBS | RESPIRATION RATE: 16 BRPM | HEIGHT: 64 IN | HEART RATE: 105 BPM | OXYGEN SATURATION: 92 %

## 2022-07-21 DIAGNOSIS — N30.00 ACUTE CYSTITIS WITHOUT HEMATURIA: Primary | ICD-10-CM

## 2022-07-21 PROCEDURE — 99213 OFFICE O/P EST LOW 20 MIN: CPT | Performed by: PHYSICIAN ASSISTANT

## 2022-07-21 PROCEDURE — G8420 CALC BMI NORM PARAMETERS: HCPCS | Performed by: PHYSICIAN ASSISTANT

## 2022-07-21 PROCEDURE — G9717 DOC PT DX DEP/BP F/U NT REQ: HCPCS | Performed by: PHYSICIAN ASSISTANT

## 2022-07-21 PROCEDURE — G8427 DOCREV CUR MEDS BY ELIG CLIN: HCPCS | Performed by: PHYSICIAN ASSISTANT

## 2022-07-21 NOTE — PROGRESS NOTES
Dai Garzon is a 40 y.o.  female presents today for office visit for acute. Pt would also like to discuss poss UTI. Pt is not fasting. Pt is in Room # 4      1. Have you been to the ER, urgent care clinic since your last visit? Hospitalized since your last visit? No    2. Have you seen or consulted any other health care providers outside of the [address] [place] [address] since your last visit? Include any pap smears or colon screening. No    Upcoming Appts  none    Health Maintenance reviewed     VORB: No orders of the defined types were placed in this encounter.   JONE Rangel-Alia Gallardo LPN

## 2022-07-22 ENCOUNTER — TELEPHONE (OUTPATIENT)
Dept: FAMILY MEDICINE CLINIC | Age: 47
End: 2022-07-22

## 2022-07-22 ENCOUNTER — PATIENT MESSAGE (OUTPATIENT)
Dept: FAMILY MEDICINE CLINIC | Age: 47
End: 2022-07-22

## 2022-07-22 DIAGNOSIS — N30.00 ACUTE CYSTITIS WITHOUT HEMATURIA: Primary | ICD-10-CM

## 2022-07-25 LAB
APPEARANCE UR: ABNORMAL
BACTERIA #/AREA URNS HPF: ABNORMAL /[HPF]
BACTERIA UR CULT: ABNORMAL
BILIRUB UR QL STRIP: NEGATIVE
CASTS URNS QL MICRO: ABNORMAL /LPF
COLOR UR: YELLOW
CRYSTALS URNS MICRO: ABNORMAL
EPI CELLS #/AREA URNS HPF: ABNORMAL /HPF (ref 0–10)
GLUCOSE UR QL STRIP: NEGATIVE
HGB UR QL STRIP: NEGATIVE
KETONES UR QL STRIP: NEGATIVE
LEUKOCYTE ESTERASE UR QL STRIP: ABNORMAL
MICRO URNS: ABNORMAL
NITRITE UR QL STRIP: NEGATIVE
PH UR STRIP: 7.5 [PH] (ref 5–7.5)
PROT UR QL STRIP: NEGATIVE
RBC #/AREA URNS HPF: ABNORMAL /HPF (ref 0–2)
SP GR UR STRIP: 1.01 (ref 1–1.03)
UNIDENT CRYS URNS QL MICRO: PRESENT
UROBILINOGEN UR STRIP-MCNC: 0.2 MG/DL (ref 0.2–1)
WBC #/AREA URNS HPF: ABNORMAL /HPF (ref 0–5)

## 2022-07-25 RX ORDER — NITROFURANTOIN 25; 75 MG/1; MG/1
100 CAPSULE ORAL 2 TIMES DAILY
Qty: 14 CAPSULE | Refills: 0 | Status: SHIPPED | OUTPATIENT
Start: 2022-07-25 | End: 2022-08-04

## 2022-08-04 ENCOUNTER — VIRTUAL VISIT (OUTPATIENT)
Dept: FAMILY MEDICINE CLINIC | Age: 47
End: 2022-08-04

## 2022-08-04 DIAGNOSIS — Z02.89 ENCOUNTER FOR COMPLETION OF FORM WITH PATIENT: ICD-10-CM

## 2022-08-04 DIAGNOSIS — E10.9 TYPE 1 DIABETES MELLITUS WITHOUT COMPLICATION (HCC): ICD-10-CM

## 2022-08-04 DIAGNOSIS — F43.10 PTSD (POST-TRAUMATIC STRESS DISORDER): ICD-10-CM

## 2022-08-04 DIAGNOSIS — M62.838 MUSCLE SPASTICITY: ICD-10-CM

## 2022-08-04 DIAGNOSIS — F32.A DEPRESSION, UNSPECIFIED DEPRESSION TYPE: ICD-10-CM

## 2022-08-04 DIAGNOSIS — K31.84 GASTROPARESIS: Primary | ICD-10-CM

## 2022-08-04 PROCEDURE — 99214 OFFICE O/P EST MOD 30 MIN: CPT | Performed by: PHYSICIAN ASSISTANT

## 2022-08-04 PROCEDURE — 3044F HG A1C LEVEL LT 7.0%: CPT | Performed by: PHYSICIAN ASSISTANT

## 2022-08-04 NOTE — PROGRESS NOTES
HISTORY OF PRESENT ILLNESS  Gertrudis Sierra is a 40 y.o. female. HPI  Pt is being seen via doxy. me for FMLA/disability form completion and to follow up. Discussed in detail with pt my concerns with filling out her forms. Most of her medical issues are related to her uncontrolled type 1 diabetes and that getting that under control would improve her health drastically. In the past pt has not been compliant with treatment and follow ups but over the past few months has been going to all her appts and following up with specialists. Advised her I would fill her forms out for 6mo and if needed would extend them as long as she is continuing to do her part with follow ups and compliance. Pt is agreeable to this. Allergies   Allergen Reactions    Amoxicillin Nausea and Vomiting       Current Outpatient Medications   Medication Sig    cyclobenzaprine (FLEXERIL) 10 mg tablet Take 1 Tablet by mouth three (3) times daily as needed for Muscle Spasm(s). fluocinoNIDE (LIDEX) 0.05 % ointment APPLY TO AFFECTED AREA TWICE A DAY    Glucagon Emergency Kit, human, 1 mg solr INJECT 1 MG INTO THE MUSCLE ONCE FOR 1 DOSE. Omnipod Dash Insulin Pod crtg INJECT 1 EACH BENEATH THE SKIN EVERY 48 HOURS. therapeutic multivitamin-minerals (THERAGRAN-M) tablet Take 1 Tablet by mouth daily. ondansetron (ZOFRAN ODT) 4 mg disintegrating tablet ALLOW 1 TABLET TO DISSOLVE ON TONGUE EVERY 8 HOURS AS NEEDED FOR NAUSEA/VOMITING. metoclopramide HCl (REGLAN) 10 mg tablet Take 10 mg by mouth Before breakfast, lunch, dinner and at bedtime. dicyclomine (BENTYL) 10 mg capsule Take 1 Capsule by mouth four (4) times daily as needed for Abdominal Cramps, Cramping or Pain. flash glucose scanning reader (FreeStyle Janet 14 Day [place]) misc 1 Each by Does Not Apply route every four (4) weeks.     Blood-Glucose Sensor (Dexcom G6 Sensor) loy Use as directed    Blood-Glucose Transmitter (Dexcom G6 Transmitter) loy Use as directed    insulin glargine (Basaglar KwikPen U-100 Insulin) 100 unit/mL (3 mL) inpn 32 Units by SubCUTAneous route daily. insulin lispro (HUMALOG) 100 unit/mL kwikpen Inject 12units subQ with each of 2 largest meals    hydrOXYzine HCL (ATARAX) 50 mg tablet Take 50 mg by mouth. sertraline HCl (ZOLOFT PO) Take  by mouth. omeprazole (PRILOSEC) 40 mg capsule Take 1 Cap by mouth daily. No current facility-administered medications for this visit. Review of Systems   Constitutional: Negative. Negative for chills, fever and malaise/fatigue. HENT: Negative. Negative for congestion, ear pain, sore throat and tinnitus. Eyes: Negative. Negative for blurred vision, double vision and photophobia. Respiratory: Negative. Negative for cough, shortness of breath and wheezing. Cardiovascular: Negative. Negative for chest pain, palpitations and leg swelling. Gastrointestinal:  Positive for abdominal pain (recurrent from gastroparesis) and nausea (chronoic). Negative for heartburn and vomiting. Genitourinary: Negative. Negative for dysuria, frequency, hematuria and urgency. Musculoskeletal:  Positive for myalgias (chronic). Negative for back pain, joint pain and neck pain. Spasms of entire left side   Skin: Negative. Negative for itching and rash. Neurological: Negative. Negative for dizziness, tingling, tremors and headaches. Psychiatric/Behavioral:  Positive for depression (controlled on med) and substance abuse (hx of not current). Negative for hallucinations, memory loss and suicidal ideas. The patient is nervous/anxious (controlled on meds). The patient does not have insomnia. Physical Exam  Constitutional:       Appearance: She is not ill-appearing or diaphoretic. Comments:     HENT:      Head: Normocephalic and atraumatic. Pulmonary:      Effort: No respiratory distress. Neurological:      Mental Status: She is alert and oriented to person, place, and time.    Psychiatric: Mood and Affect: Mood is anxious and depressed. Thought Content: Thought content normal. Thought content is not paranoid. Thought content does not include homicidal or suicidal ideation. ASSESSMENT and PLAN    ICD-10-CM ICD-9-CM    1. Gastroparesis  K31.84 536.3       2. Type 1 diabetes mellitus without complication (HCC)  L05.5 250.01       3. Muscle spasticity  M62.838 728.85       4. Encounter for completion of form with patient  Z02.89 V68.89       5. Depression, unspecified depression type  F32. A 311       6. PTSD (post-traumatic stress disorder)  F43.10 309.81         Follow-up and Dispositions    Return in about 3 months (around 11/4/2022) for follow up. Pt expressed understanding of visit summary and plans for any follow ups or referrals as well as any medications prescribedKranthi Marcano, was evaluated through a synchronous (real-time) audio-video encounter. The patient (or guardian if applicable) is aware that this is a billable service, which includes applicable co-pays. This Virtual Visit was conducted with patient's (and/or legal guardian's) consent. The visit was conducted pursuant to the emergency declaration under the [facility], [address] [place] [address] authority and the [employer] and [employer] General Act. Patient identification was verified, and a caregiver was present when appropriate. The patient was located at: Home: [address] [facility] [phone]  The provider was located at:  Facility (Appt Department): [address]  [phone]

## 2022-08-08 ENCOUNTER — TELEPHONE (OUTPATIENT)
Dept: FAMILY MEDICINE CLINIC | Age: 47
End: 2022-08-08

## 2022-08-23 ENCOUNTER — TELEPHONE (OUTPATIENT)
Dept: FAMILY MEDICINE CLINIC | Age: 47
End: 2022-08-23

## 2022-08-23 NOTE — TELEPHONE ENCOUNTER
Patient is calling asking for a prescription for Gabapentin. She states that it is the only thing keeping her out of the hospital.  The pain management doctor she was referred to does not take medicaid.

## 2022-08-31 NOTE — PROGRESS NOTES
HISTORY OF PRESENT ILLNESS  Jeet Colder is a 40 y.o. female. HPI  Pt is being seen via doxy. me for f/u on her FMLA forms. She needs them updated with new dates and otherwise they are good. She has rheumatology appt next month and endo next week. She is scheduling her appt for her pump as well. Allergies   Allergen Reactions    Amoxicillin Nausea and Vomiting       Current Outpatient Medications   Medication Sig    cyclobenzaprine (FLEXERIL) 10 mg tablet Take 1 Tablet by mouth three (3) times daily as needed for Muscle Spasm(s). fluocinoNIDE (LIDEX) 0.05 % ointment APPLY TO AFFECTED AREA TWICE A DAY    Glucagon Emergency Kit, human, 1 mg solr INJECT 1 MG INTO THE MUSCLE ONCE FOR 1 DOSE. Omnipod Dash Insulin Pod crtg INJECT 1 EACH BENEATH THE SKIN EVERY 48 HOURS. therapeutic multivitamin-minerals (THERAGRAN-M) tablet Take 1 Tablet by mouth daily. ondansetron (ZOFRAN ODT) 4 mg disintegrating tablet ALLOW 1 TABLET TO DISSOLVE ON TONGUE EVERY 8 HOURS AS NEEDED FOR NAUSEA/VOMITING. metoclopramide HCl (REGLAN) 10 mg tablet Take 10 mg by mouth Before breakfast, lunch, dinner and at bedtime. dicyclomine (BENTYL) 10 mg capsule Take 1 Capsule by mouth four (4) times daily as needed for Abdominal Cramps, Cramping or Pain. flash glucose scanning reader ([employer] Janet 14 Day [place]) misc 1 Each by Does Not Apply route every four (4) weeks. Blood-Glucose Sensor (Dexcom G6 Sensor) loy Use as directed    Blood-Glucose Transmitter (Dexcom G6 Transmitter) loy Use as directed    insulin glargine (Basaglar KwikPen U-100 Insulin) 100 unit/mL (3 mL) inpn 32 Units by SubCUTAneous route daily. insulin lispro (HUMALOG) 100 unit/mL kwikpen Inject 12units subQ with each of 2 largest meals    hydrOXYzine HCL (ATARAX) 50 mg tablet Take 50 mg by mouth. sertraline HCl (ZOLOFT PO) Take  by mouth. omeprazole (PRILOSEC) 40 mg capsule Take 1 Cap by mouth daily.      No current facility-administered medications for this visit. Review of Systems   Constitutional: Negative. Negative for chills, fever and malaise/fatigue. HENT: Negative. Negative for congestion, ear pain, sore throat and tinnitus. Eyes: Negative. Negative for blurred vision, double vision and photophobia. Respiratory: Negative. Negative for cough, shortness of breath and wheezing. Cardiovascular: Negative. Negative for chest pain, palpitations and leg swelling. Gastrointestinal:  Positive for abdominal pain (recurrent from gastroparesis) and nausea (chronoic). Negative for heartburn and vomiting. Genitourinary: Negative. Negative for dysuria, frequency, hematuria and urgency. Musculoskeletal:  Positive for myalgias (chronic). Negative for back pain, joint pain and neck pain. Spasms of entire left side   Skin: Negative. Negative for itching and rash. Neurological: Negative. Negative for dizziness, tingling, tremors and headaches. Psychiatric/Behavioral:  Positive for depression (controlled on med) and substance abuse (hx of not current). Negative for hallucinations, memory loss and suicidal ideas. The patient is nervous/anxious (controlled on meds). The patient does not have insomnia. Physical Exam  Constitutional:       Appearance: She is not ill-appearing or diaphoretic. Comments:     HENT:      Head: Normocephalic and atraumatic. Pulmonary:      Effort: No respiratory distress. Neurological:      Mental Status: She is alert and oriented to person, place, and time. Psychiatric:         Mood and Affect: Mood is anxious and depressed. Thought Content: Thought content normal. Thought content is not paranoid. Thought content does not include homicidal or suicidal ideation. ASSESSMENT and PLAN    ICD-10-CM ICD-9-CM    1. Type 1 diabetes mellitus without complication (HCC)  B70.6 250.01       2. Gastroparesis  K31.84 536.3       3.  Encounter for completion of form with patient Z02.89 V68.89       4. Muscle spasticity  M62.838 728.85       5. Depression, unspecified depression type  F32. A 311         Follow-up and Dispositions    Return in about 3 months (around 9/27/2022) for follow up. Pt expressed understanding of visit summary and plans for any follow ups or referrals as well as any medications prescribed. Indu Dumont, was evaluated through a synchronous (real-time) audio-video encounter. The patient (or guardian if applicable) is aware that this is a billable service, which includes applicable co-pays. This Virtual Visit was conducted with patient's (and/or legal guardian's) consent. The visit was conducted pursuant to the emergency declaration under the [address] [place] [address], [address] [place] [address] authority and the [employer] and [facility] General Act. Patient identification was verified, and a caregiver was present when appropriate. The patient was located at: Home: [address] [place] [phone]  The provider was located at:  Facility (Appt Department): [address]  [phone]

## 2022-09-09 ENCOUNTER — OFFICE VISIT (OUTPATIENT)
Dept: FAMILY MEDICINE CLINIC | Age: 47
End: 2022-09-09
Payer: MEDICAID

## 2022-09-09 VITALS
BODY MASS INDEX: 22.88 KG/M2 | OXYGEN SATURATION: 95 % | HEIGHT: 64 IN | SYSTOLIC BLOOD PRESSURE: 123 MMHG | WEIGHT: 134 LBS | DIASTOLIC BLOOD PRESSURE: 76 MMHG | TEMPERATURE: 98.1 F | RESPIRATION RATE: 16 BRPM | HEART RATE: 92 BPM

## 2022-09-09 DIAGNOSIS — M25.511 ACUTE PAIN OF RIGHT SHOULDER: ICD-10-CM

## 2022-09-09 DIAGNOSIS — R10.12 LUQ DISCOMFORT: Primary | ICD-10-CM

## 2022-09-09 DIAGNOSIS — M54.2 NECK PAIN: ICD-10-CM

## 2022-09-09 DIAGNOSIS — R10.12 LUQ DISCOMFORT: ICD-10-CM

## 2022-09-09 PROCEDURE — G8420 CALC BMI NORM PARAMETERS: HCPCS | Performed by: PHYSICIAN ASSISTANT

## 2022-09-09 PROCEDURE — 99213 OFFICE O/P EST LOW 20 MIN: CPT | Performed by: PHYSICIAN ASSISTANT

## 2022-09-09 PROCEDURE — G8427 DOCREV CUR MEDS BY ELIG CLIN: HCPCS | Performed by: PHYSICIAN ASSISTANT

## 2022-09-09 PROCEDURE — G9717 DOC PT DX DEP/BP F/U NT REQ: HCPCS | Performed by: PHYSICIAN ASSISTANT

## 2022-09-09 NOTE — PROGRESS NOTES
Margaret Query is a 40 y.o.  female presents today for office visit for acute care. Pt would also like to discuss shoulder pain. Pt is not fasting. Pt is in Room # 6      1. Have you been to the ER, urgent care clinic since your last visit? Hospitalized since your last visit? No    2. Have you seen or consulted any other health care providers outside of the [address] [place] [address] since your last visit? Include any pap smears or colon screening. No    Upcoming Appts  none        VORB: No orders of the defined types were placed in this encounter.   JONE Pereira-Alia Patel LPN

## 2022-09-09 NOTE — PROGRESS NOTES
HISTORY OF PRESENT ILLNESS  Niurka Solano is a 56 y.o. female. HPI  Pt is being seen for pain in her right shoulder that is not improving. She denies injury/trauma and states she woke up with the pain a few weeks ago and it is not improving. Denies numbness/tingling, weakness, and edema. She also has had LUQ discomfort. Given her hx of gastroparesis likely constipation so will get xrays. She denies fever, chills, N/V/D (currently), and radiating pain. Allergies   Allergen Reactions    Amoxicillin Nausea and Vomiting     Current Outpatient Medications   Medication Sig    cyclobenzaprine (FLEXERIL) 10 mg tablet Take 1 Tablet by mouth three (3) times daily as needed for Muscle Spasm(s). dicyclomine (BENTYL) 10 mg capsule Take 1 Capsule by mouth four (4) times daily as needed for Abdominal Cramps, Cramping or Pain. flash glucose scanning reader ([employer] Janet 14 Day [place]) misc 1 Each by Does Not Apply route every four (4) weeks. insulin glargine (Basaglar KwikPen U-100 Insulin) 100 unit/mL (3 mL) inpn 32 Units by SubCUTAneous route daily. insulin lispro (HUMALOG) 100 unit/mL kwikpen Inject 12units subQ with each of 2 largest meals    hydrOXYzine HCL (ATARAX) 50 mg tablet Take 50 mg by mouth. sertraline HCl (ZOLOFT PO) Take  by mouth. Blood-Glucose Transmitter (Dexcom G6 Transmitter) loy Use as directed     No current facility-administered medications for this visit. Review of Systems   Constitutional: Negative. Negative for chills, fever and malaise/fatigue. HENT: Negative. Negative for congestion, ear pain, sore throat and tinnitus. Eyes: Negative. Negative for blurred vision, double vision and photophobia. Respiratory: Negative. Negative for cough, shortness of breath and wheezing. Cardiovascular: Negative. Negative for chest pain, palpitations and leg swelling.    Gastrointestinal:  Positive for abdominal pain (recurrent from gastroparesis - more LUQ recently) and nausea (chronoic). Negative for heartburn and vomiting. Genitourinary: Negative. Negative for dysuria, frequency, hematuria and urgency. Musculoskeletal:  Positive for joint pain (shoulder) and myalgias (chronic). Negative for back pain and neck pain. Spasms of entire left side   Skin: Negative. Negative for itching and rash. Neurological: Negative. Negative for dizziness, tingling, tremors and headaches. Psychiatric/Behavioral:  Positive for depression (controlled on med) and substance abuse (hx of not current). Negative for hallucinations, memory loss and suicidal ideas. The patient is nervous/anxious (controlled on meds). The patient does not have insomnia. Visit Vitals  /76 (BP 1 Location: Left upper arm, BP Patient Position: Sitting)   Pulse 92   Temp 98.1 °F (36.7 °C) (Temporal)   Resp 16   Ht 5' 4\" (1.626 m)   Wt 134 lb (60.8 kg)   SpO2 95%   BMI 23.00 kg/m²         Physical Exam  Vitals reviewed. Constitutional:       Appearance: She is not ill-appearing or diaphoretic. Comments:     HENT:      Head: Normocephalic and atraumatic. Pulmonary:      Effort: No respiratory distress. Musculoskeletal:      Right shoulder: No swelling, deformity, tenderness, bony tenderness or crepitus. Decreased range of motion. Normal pulse. Left shoulder: Normal.      Cervical back: Spasms (right) and tenderness (right) present. Neurological:      Mental Status: She is alert and oriented to person, place, and time. Psychiatric:         Mood and Affect: Mood is anxious and depressed. Thought Content: Thought content normal. Thought content is not paranoid. Thought content does not include homicidal or suicidal ideation. ASSESSMENT and PLAN    ICD-10-CM ICD-9-CM    1. LUQ discomfort  R10.12 789.02 XR ABD (AP AND ERECT OR DECUB)      2. Acute pain of right shoulder  M25.511 719.41 XR SHOULDER RT 1V      3.  Neck pain  M54.2 723.1 XR SPINE CERV PA LAT ODONT 3 V MAX Follow-up and Dispositions    Return if symptoms worsen or fail to improve. Pt expressed understanding of visit summary and plans for any follow ups or referrals as well as any medications prescribed.

## 2022-09-13 ENCOUNTER — TELEPHONE (OUTPATIENT)
Dept: FAMILY MEDICINE CLINIC | Age: 47
End: 2022-09-13

## 2022-09-13 DIAGNOSIS — M54.2 NECK PAIN: ICD-10-CM

## 2022-09-13 DIAGNOSIS — R93.7 ABNORMAL X-RAY OF CERVICAL SPINE: ICD-10-CM

## 2022-09-13 DIAGNOSIS — R93.7 ABNORMAL X-RAY OF CERVICAL SPINE: Primary | ICD-10-CM

## 2022-09-30 ENCOUNTER — VIRTUAL VISIT (OUTPATIENT)
Dept: FAMILY MEDICINE CLINIC | Age: 47
End: 2022-09-30

## 2022-10-16 NOTE — PROGRESS NOTES
HISTORY OF PRESENT ILLNESS  Javi Ga is a 56 y.o. female. HPI  Pt is being seen for concerns about a UTI. She has had dysuria, and frequency for the past 2 weeks that is worsening. She denies hematuria, fever, chills, abd/pelvic pain, flank pain, N/V/D, and vaginal discharge. Allergies   Allergen Reactions    Amoxicillin Nausea and Vomiting       Current Outpatient Medications   Medication Sig    cyclobenzaprine (FLEXERIL) 10 mg tablet Take 1 Tablet by mouth three (3) times daily as needed for Muscle Spasm(s). fluocinoNIDE (LIDEX) 0.05 % ointment APPLY TO AFFECTED AREA TWICE A DAY (Patient not taking: Reported on 9/9/2022)    Glucagon Emergency Kit, human, 1 mg solr INJECT 1 MG INTO THE MUSCLE ONCE FOR 1 DOSE. (Patient not taking: Reported on 9/9/2022)    Omnipod Dash Insulin Pod crtg INJECT 1 EACH BENEATH THE SKIN EVERY 48 HOURS. (Patient not taking: Reported on 9/9/2022)    therapeutic multivitamin-minerals (THERAGRAN-M) tablet Take 1 Tablet by mouth daily. (Patient not taking: Reported on 9/9/2022)    ondansetron (ZOFRAN ODT) 4 mg disintegrating tablet ALLOW 1 TABLET TO DISSOLVE ON TONGUE EVERY 8 HOURS AS NEEDED FOR NAUSEA/VOMITING. (Patient not taking: Reported on 9/9/2022)    metoclopramide HCl (REGLAN) 10 mg tablet Take 10 mg by mouth Before breakfast, lunch, dinner and at bedtime. (Patient not taking: Reported on 9/9/2022)    dicyclomine (BENTYL) 10 mg capsule Take 1 Capsule by mouth four (4) times daily as needed for Abdominal Cramps, Cramping or Pain. flash glucose scanning reader (FreeStyle Janet 14 Day [place]) misc 1 Each by Does Not Apply route every four (4) weeks. Blood-Glucose Sensor (Dexcom G6 Sensor) loy Use as directed (Patient not taking: Reported on 9/9/2022)    Blood-Glucose Transmitter (Dexcom G6 Transmitter) loy Use as directed    insulin glargine (Basaglar KwikPen U-100 Insulin) 100 unit/mL (3 mL) inpn 32 Units by SubCUTAneous route daily.     hydrOXYzine HCL (ATARAX) 50 mg tablet Take 50 mg by mouth. sertraline HCl (ZOLOFT PO) Take  by mouth. omeprazole (PRILOSEC) 40 mg capsule Take 1 Cap by mouth daily. (Patient not taking: Reported on 9/9/2022)    insulin lispro (HUMALOG) 100 unit/mL kwikpen Inject 12units subQ with each of 2 largest meals     No current facility-administered medications for this visit. Review of Systems   Constitutional: Negative. Negative for chills, fever and malaise/fatigue. HENT: Negative. Negative for congestion, ear pain, sore throat and tinnitus. Eyes: Negative. Negative for blurred vision, double vision and photophobia. Respiratory: Negative. Negative for cough, shortness of breath and wheezing. Cardiovascular: Negative. Negative for chest pain, palpitations and leg swelling. Gastrointestinal:  Negative for abdominal pain, heartburn, nausea and vomiting. Genitourinary:  Positive for dysuria and frequency. Negative for hematuria and urgency. Musculoskeletal:  Positive for myalgias (chronic). Negative for back pain, joint pain and neck pain. Skin: Negative. Negative for itching and rash. Neurological: Negative. Negative for dizziness, tingling, tremors and headaches. Psychiatric/Behavioral:  Positive for depression (controlled on med) and substance abuse (hx of not current). Negative for hallucinations, memory loss and suicidal ideas. The patient is nervous/anxious (controlled on meds). The patient does not have insomnia. Visit Vitals  /77 (BP 1 Location: Left upper arm, BP Patient Position: Sitting)   Pulse (!) 105   Temp 97 °F (36.1 °C) (Temporal)   Resp 16   Ht 5' 4\" (1.626 m)   Wt 131 lb (59.4 kg)   SpO2 92%   BMI 22.49 kg/m²       Physical Exam  Constitutional:       Appearance: She is not ill-appearing or diaphoretic. Comments:     HENT:      Head: Normocephalic and atraumatic. Cardiovascular:      Rate and Rhythm: Regular rhythm. Tachycardia present. Pulses: Normal pulses. Heart sounds: Normal heart sounds. Pulmonary:      Effort: Pulmonary effort is normal. No respiratory distress. Breath sounds: Normal breath sounds. Neurological:      Mental Status: She is alert and oriented to person, place, and time. Psychiatric:         Mood and Affect: Mood and affect normal. Mood is not anxious or depressed. Behavior: Behavior normal.         Thought Content: Thought content normal. Thought content is not paranoid. Thought content does not include homicidal or suicidal ideation. ASSESSMENT and PLAN    ICD-10-CM ICD-9-CM    1. Acute cystitis without hematuria  N30.00 595.0         Follow-up and Dispositions    Return in about 6 months (around 1/21/2023) for follow up. Pt expressed understanding of visit summary and plans for any follow ups or referrals as well as any medications prescribed.

## 2022-10-17 ENCOUNTER — TELEPHONE (OUTPATIENT)
Dept: FAMILY MEDICINE CLINIC | Age: 47
End: 2022-10-17

## 2022-10-17 NOTE — TELEPHONE ENCOUNTER
----- Message from Martha Del Valle sent at 10/14/2022  1:28 PM EDT -----  Subject: Refill Request    QUESTIONS  Name of Medication? oxyCODONE-acetaminophen (PERCOCET) 5-325 mg per tablet  Patient-reported dosage and instructions? 5-325 MG, Take 1 Tablet by mouth   every eight (8) hours as needed for Pain  How many days do you have left? 0  Preferred Pharmacy? [facility]/PHARMACY #[id number]  Pharmacy phone number (if available)? [phone]  Additional Information for Provider? Patient is needing a refill before   the office close. She has a teeth extraction coming up   ---------------------------------------------------------------------------  --------------  CALL BACK INFO  What is the best way for the office to contact you? OK to leave message on   voicemail  Preferred Call Back Phone Number? [phone]  ---------------------------------------------------------------------------  --------------  SCRIPT ANSWERS  Relationship to Patient?  Self

## 2022-11-02 ENCOUNTER — TELEPHONE (OUTPATIENT)
Dept: FAMILY MEDICINE CLINIC | Age: 47
End: 2022-11-02

## 2022-11-02 NOTE — TELEPHONE ENCOUNTER
Patient was on schedule for 11/3/22 as a new patient needing a pre op clearance. Called patient to inform her that because she was new to the practice we would not be able to do a clearance until after she is established here as a patient. Patient became verbally combative before I had a chance to explain to her the reason for the call. She would not let me get a word in. She kept stating 'I know you not calling to cancel my appt. I need this pre op clearance.' I kept trying to explain to her the situation but was unable to get a chance to speak. Finally due to the inability to get a chance to speak, I hung up. Spoke with NP Isa Benson and we both were in agreement that this was not a patient we were willing to take on as a patient. This being said I had  to cancel her appt and called patient and left a message on voicemail that we were canceling her appt.

## 2023-02-22 ENCOUNTER — OFFICE VISIT (OUTPATIENT)
Dept: INTERNAL MEDICINE CLINIC | Age: 48
End: 2023-02-22
Payer: MEDICARE

## 2023-02-22 VITALS
TEMPERATURE: 98 F | RESPIRATION RATE: 18 BRPM | WEIGHT: 150 LBS | OXYGEN SATURATION: 97 % | BODY MASS INDEX: 25.61 KG/M2 | HEART RATE: 72 BPM | DIASTOLIC BLOOD PRESSURE: 78 MMHG | SYSTOLIC BLOOD PRESSURE: 121 MMHG | HEIGHT: 64 IN

## 2023-02-22 DIAGNOSIS — Z11.59 NEED FOR HEPATITIS C SCREENING TEST: ICD-10-CM

## 2023-02-22 DIAGNOSIS — K31.84 GASTROPARESIS: Primary | ICD-10-CM

## 2023-02-22 DIAGNOSIS — G89.29 OTHER CHRONIC PAIN: ICD-10-CM

## 2023-02-22 DIAGNOSIS — E87.6 HYPOKALEMIA: ICD-10-CM

## 2023-02-22 DIAGNOSIS — E10.9 TYPE 1 DIABETES MELLITUS WITHOUT COMPLICATION (HCC): ICD-10-CM

## 2023-02-22 PROCEDURE — 2022F DILAT RTA XM EVC RTNOPTHY: CPT | Performed by: STUDENT IN AN ORGANIZED HEALTH CARE EDUCATION/TRAINING PROGRAM

## 2023-02-22 PROCEDURE — 3046F HEMOGLOBIN A1C LEVEL >9.0%: CPT | Performed by: STUDENT IN AN ORGANIZED HEALTH CARE EDUCATION/TRAINING PROGRAM

## 2023-02-22 PROCEDURE — 99204 OFFICE O/P NEW MOD 45 MIN: CPT | Performed by: STUDENT IN AN ORGANIZED HEALTH CARE EDUCATION/TRAINING PROGRAM

## 2023-02-22 PROCEDURE — G9717 DOC PT DX DEP/BP F/U NT REQ: HCPCS | Performed by: STUDENT IN AN ORGANIZED HEALTH CARE EDUCATION/TRAINING PROGRAM

## 2023-02-22 PROCEDURE — G8427 DOCREV CUR MEDS BY ELIG CLIN: HCPCS | Performed by: STUDENT IN AN ORGANIZED HEALTH CARE EDUCATION/TRAINING PROGRAM

## 2023-02-22 PROCEDURE — G8419 CALC BMI OUT NRM PARAM NOF/U: HCPCS | Performed by: STUDENT IN AN ORGANIZED HEALTH CARE EDUCATION/TRAINING PROGRAM

## 2023-02-22 RX ORDER — PREGABALIN 150 MG/1
150 CAPSULE ORAL 2 TIMES DAILY
Qty: 180 CAPSULE | Refills: 0 | Status: SHIPPED | OUTPATIENT
Start: 2023-02-22

## 2023-02-22 RX ORDER — PREGABALIN 150 MG/1
CAPSULE ORAL
COMMUNITY
Start: 2022-12-16 | End: 2023-02-22 | Stop reason: SDUPTHER

## 2023-02-22 RX ORDER — CHOLECALCIFEROL (VITAMIN D3) 125 MCG
CAPSULE ORAL
COMMUNITY

## 2023-02-22 RX ORDER — FLASH GLUCOSE SENSOR
KIT MISCELLANEOUS
COMMUNITY

## 2023-02-22 RX ORDER — VITAMIN A 2400 MCG
CAPSULE ORAL
COMMUNITY

## 2023-02-22 RX ORDER — IRON ASPGLY/C/B12/FA/CA-TH/SUC 70-150-1MG
TABLET ORAL
COMMUNITY

## 2023-02-22 NOTE — PROGRESS NOTES
No chief complaint on file. 1. \"Have you been to the ER, urgent care clinic since your last visit? Hospitalized since your last visit? yes, DKA, All in the chart. 2. \"Have you seen or consulted any other health care providers outside of the [address] [place] [address] since your last visit? yes    3. For patients aged 39-70: Has the patient had a colonoscopy / FIT/ Cologuard? Yes - no Care Gap present      If the patient is female:    4. For patients aged 41-77: Has the patient had a mammogram within the past 2 years? No      5. For patients aged 21-65: Has the patient had a pap smear?  No

## 2023-02-22 NOTE — PROGRESS NOTES
[facility]   Internal Medicine  [address], [address] [facility]  [address]  [place], [address]  [phone]      Primary Care Visit Note    Assessment/Plan:       DM1 - 36-40 units at night. She does 8-12 units of mealtime insulin. Patient has CGM. we will check A1c and basic lab work and refer to endocrine. Gastroparesis-previously on Bentyl and had been following with GI in [place], and needs to reestablish in [facility] [place]. Fibromyalgia  Chronic pain - on duloxetine and pregabalin, flexeril as needed.   -Patient states she has AD paperwork that needs to be completed and will schedule a separate appointment for further discussion on that. Anxiety and depression-follows with psychiatry on duloxetine and hydroxyzine. Follow up: Follow-up and Dispositions    Return in about 1 week (around 3/1/2023). Naty Nair MD    CC:   No chief complaint on file. HPI:     Joo Hernandes is a 56 y.o. female who presents for:    Establishing care    Pt prev seen by [employer]. Pt moved to [place] from [place]. DM1 - a1c typically running in the low 6's. She states that her BG has been difficult to control. She has checks in the 50's but more often in the 200's or 300's. She has had repeated DKA up to every 2 months or more. At one point she states that her potassium was as low was 1. She was following w endocrinology in [place]. She cannot get back there as she does not have transportation. Patient states that she needs a referral to endocrine in this area but is concerned that she may not be given the time that she needs at [facility] and would prefer [facility] instead     She states that she intermittently has horrible fibromyalgia flares that landed her in the emergency department and requires a lot of pain medication. ROS:   All 10 point review of systems negative except those mentioned in the HPI.            Past Medical History: Active Ambulatory Problems     Diagnosis Date Noted    Depression      Resolved Ambulatory Problems     Diagnosis Date Noted    No Resolved Ambulatory Problems     Past Medical History:   Diagnosis Date    Anxiety     Diabetes ([facility] [address]     DKA (diabetic ketoacidoses)     Gastroparesis     Gastroparesis     PTSD (post-traumatic stress disorder)           Current Medications:     Current Outpatient Medications:     flash glucose sensor (FreeStyle Janet 2 Sensor) kit, [employer] 2 Sensor kit  APPLY 1 SENSOR ONTO THE SKIN EVERY 14 DAYS TO MEASURE BLOOD SUGAR, Disp: , Rfl:     ginger, Zingiber officinalis, (ginger extract) 250 mg cap, Take  by mouth., Disp: , Rfl:     iron [id number] (Multigen Folic) tab capsule, Take  by mouth. Indications: with patassium. , Disp: , Rfl:     melatonin 5 mg tablet, Take  by mouth nightly., Disp: , Rfl:     pregabalin (LYRICA) 150 mg capsule, Take 1 Capsule by mouth two (2) times a day. Max Daily Amount: 300 mg., Disp: 180 Capsule, Rfl: 0    cyclobenzaprine (FLEXERIL) 10 mg tablet, Take 1 Tablet by mouth three (3) times daily as needed for Muscle Spasm(s). , Disp: 30 Tablet, Rfl: 2    dicyclomine (BENTYL) 10 mg capsule, Take 1 Capsule by mouth four (4) times daily as needed for Abdominal Cramps, Cramping or Pain., Disp: 30 Capsule, Rfl: 2    flash glucose scanning reader (FreeStyle Janet 14 Day [place]) misc, 1 Each by Does Not Apply route every four (4) weeks. , Disp: 1 Each, Rfl: 3    Blood-Glucose Transmitter (Dexcom G6 Transmitter) loy, Use as directed, Disp: 1 Device, Rfl: 3    insulin glargine (Basaglar KwikPen U-100 Insulin) 100 unit/mL (3 mL) inpn, 32 Units by SubCUTAneous route daily. , Disp: 10 Pen, Rfl: 3    insulin lispro (HUMALOG) 100 unit/mL kwikpen, Inject 12units subQ with each of 2 largest meals, Disp: 10 Pen, Rfl: 3    hydrOXYzine HCL (ATARAX) 50 mg tablet, Take 50 mg by mouth., Disp: , Rfl:       Past Surgical History:     Past Surgical History:   Procedure Laterality Date    HX APPENDECTOMY           Family History:     Family History   Problem Relation Age of Onset    Cancer Mother     Diabetes Mother     Diabetes Brother     Cancer Maternal Grandfather          Social History:     Social History     Socioeconomic History    Marital status: UNKNOWN     Spouse name: Not on file    Number of children: Not on file    Years of education: Not on file    Highest education level: Not on file   Occupational History    Not on file   Tobacco Use    Smoking status: Former     Years: 5.00     Types: Cigarettes    Smokeless tobacco: Never   Vaping Use    Vaping Use: Never used   Substance and Sexual Activity    Alcohol use: Yes    Drug use: Not on file    Sexual activity: Yes     Partners: Male     Birth control/protection: Condom   Other Topics Concern    Not on file   Social History Narrative    Not on file     Social Determinants of Health     Financial Resource Strain: Not on file   Food Insecurity: Not on file   Transportation Needs: Not on file   Physical Activity: Not on file   Stress: Not on file   Social Connections: Not on file   Intimate Partner Violence: Not on file   Housing Stability: Not on file            Visit Vitals  /78   Pulse 72   Temp 98 °F (36.7 °C) (Temporal)   Resp 18   Ht 5' 4\" (1.626 m)   Wt 150 lb (68 kg)   LMP 01/25/2023   SpO2 97%   BMI 25.75 kg/m²       Physical Exam:   General - Well appearing  HEENT - EOMI, non-icteric sclera.    Pulm - clear to auscultation bilaterally  Cardio - RRR, normal S1 S2, no murmur  Abd - soft, nontender, nabs  Extrem - no edema  Neuro-  Alert and oriented, No focal deficits      Labs/Imaging:     Labs and imaging reviewed by me and significant for:    Lab Results   Component Value Date/Time    WBC 13.5 (H) 09/07/2020 07:05 AM    HGB 15.0 09/07/2020 07:05 AM    HCT 43.2 09/07/2020 07:05 AM    PLATELET 508 88/69/2692 07:05 AM    MCV 91.1 09/07/2020 07:05 AM     Lab Results   Component Value Date/Time    Sodium 134 (L) 09/07/2020 07:05 AM    Potassium 4.4 09/07/2020 07:05 AM    Chloride 104 09/07/2020 07:05 AM    CO2 22 09/07/2020 07:05 AM    Anion gap 8 09/07/2020 07:05 AM    Glucose 375 (H) 09/07/2020 07:05 AM    BUN 11 09/07/2020 07:05 AM    Creatinine 0.88 09/07/2020 07:05 AM    BUN/Creatinine ratio 13 09/07/2020 07:05 AM    GFR est AA >60 09/07/2020 07:05 AM    GFR est non-AA >60 09/07/2020 07:05 AM    Calcium 8.9 09/07/2020 07:05 AM    Bilirubin, total 0.3 09/07/2020 07:05 AM    Alk.  phosphatase 114 09/07/2020 07:05 AM    Protein, total 7.5 09/07/2020 07:05 AM    Albumin 4.3 09/07/2020 07:05 AM    Globulin 3.2 09/07/2020 07:05 AM    A-G Ratio 1.3 09/07/2020 07:05 AM    ALT (SGPT) 36 09/07/2020 07:05 AM    AST (SGOT) 50 (H) 09/07/2020 07:05 AM     Lab Results   Component Value Date/Time    Hemoglobin A1c (POC) 6.8 05/24/2021 11:06 AM    Hemoglobin A1c, External 6.8 05/15/2022 12:00 AM

## 2023-03-01 ENCOUNTER — VIRTUAL VISIT (OUTPATIENT)
Dept: INTERNAL MEDICINE CLINIC | Age: 48
End: 2023-03-01
Payer: MEDICARE

## 2023-03-01 DIAGNOSIS — M62.838 MUSCLE SPASTICITY: ICD-10-CM

## 2023-03-01 DIAGNOSIS — N92.6 LATE PERIOD: Primary | ICD-10-CM

## 2023-03-01 PROCEDURE — 99213 OFFICE O/P EST LOW 20 MIN: CPT | Performed by: STUDENT IN AN ORGANIZED HEALTH CARE EDUCATION/TRAINING PROGRAM

## 2023-03-01 PROCEDURE — G9717 DOC PT DX DEP/BP F/U NT REQ: HCPCS | Performed by: STUDENT IN AN ORGANIZED HEALTH CARE EDUCATION/TRAINING PROGRAM

## 2023-03-01 PROCEDURE — G8427 DOCREV CUR MEDS BY ELIG CLIN: HCPCS | Performed by: STUDENT IN AN ORGANIZED HEALTH CARE EDUCATION/TRAINING PROGRAM

## 2023-03-01 RX ORDER — CYCLOBENZAPRINE HCL 10 MG
10 TABLET ORAL
Qty: 30 TABLET | Refills: 2 | Status: SHIPPED | OUTPATIENT
Start: 2023-03-01

## 2023-03-01 NOTE — PROGRESS NOTES
Good Help to Those in [facility]   Internal Medicine  [address] [facility] [address], [address] [facility]  [address]  [place], [address]  [phone]    Sam Peralta, was evaluated through a synchronous (real-time) audio-video encounter. The patient (or guardian if applicable) is aware that this is a billable service, which includes applicable co-pays. This Virtual Visit was conducted with patient's (and/or legal guardian's) consent. The visit was conducted pursuant to the emergency declaration under the [facility], [facility] authority and the [employer] Act. Patient identification was verified, and a caregiver was present when appropriate. The patient was located at: Home: [place] [phone]  The provider was located at: Facility (Appt Department): [place] [address]      --Liza Padilla MD on 3/1/2023 at 4:42 PM    An electronic signature was used to authenticate this note. Primary Care Visit Note    Assessment/Plan:     Chronic pain  Fibromyalgia - improved on duloxetine and pregabalin, flexeril as needed but still has intermittent flares     Gastroparesis-previously on Bentyl and had been following with GI in [place], and needs to reestablish in [address] [facility]. She states she has had a previous gastric emptying study - will work to obtain records. Anxiety and depression-follows with psychiatry on duloxetine and hydroxyzine. DM1 - 36-40 units at night. She does 8-12 units of mealtime insulin. Patient has CGM. we will check A1c and basic lab work and refer to endocrine. HM - pt planning to come to the office to get blood work done from prior visit.     Liza Padilla MD    CC:     Chief Complaint   Patient presents with    Referral Follow Up       HPI:     Sam Peralta is a 56 y.o. female who presents for evaluation of Fibromyalgia and     Fibromyalgia - she has muscle and joint pain. She has constant pain in all the muscle of her body. Right now her left middle finger is painful as well. She has nausea as well. patient has intermittent flares of muscle and joint pain requiring her to take time off work. She typically requires about 5 days/month. She also has difficulty with working more than 20 hours/week. Pt also has gastroparesis and is wondering if she actually has gastroparesis or these are just sx of fibromyalgia. Completed ADA paperwork. MDD - has been worse previously. ROS:   All 10 point review of systems negative except those mentioned in the HPI. Past Medical History: Active Ambulatory Problems     Diagnosis Date Noted    Depression      Resolved Ambulatory Problems     Diagnosis Date Noted    No Resolved Ambulatory Problems     Past Medical History:   Diagnosis Date    Anxiety     Diabetes ([facility] [address]     DKA (diabetic ketoacidoses)     Gastroparesis     Gastroparesis     PTSD (post-traumatic stress disorder)           Current Medications:     Current Outpatient Medications:     flash glucose sensor (FreeStyle Janet 2 Sensor) kit, . [facility] 2 Sensor kit  APPLY 1 SENSOR ONTO THE SKIN EVERY 14 DAYS TO MEASURE BLOOD SUGAR, Disp: , Rfl:     ginger, Zingiber officinalis, (ginger extract) 250 mg cap, Take  by mouth., Disp: , Rfl:     iron [id number] (Multigen Folic) tab capsule, Take  by mouth. Indications: with patassium. , Disp: , Rfl:     melatonin 5 mg tablet, Take  by mouth nightly., Disp: , Rfl:     pregabalin (LYRICA) 150 mg capsule, Take 1 Capsule by mouth two (2) times a day. Max Daily Amount: 300 mg., Disp: 180 Capsule, Rfl: 0    cyclobenzaprine (FLEXERIL) 10 mg tablet, Take 1 Tablet by mouth three (3) times daily as needed for Muscle Spasm(s). , Disp: 30 Tablet, Rfl: 2    dicyclomine (BENTYL) 10 mg capsule, Take 1 Capsule by mouth four (4) times daily as needed for Abdominal Cramps, Cramping or Pain., Disp: 30 Capsule, Rfl: 2    flash glucose scanning reader (FreeStyle Janet 14 Day [place]) misc, 1 Each by Does Not Apply route every four (4) weeks. , Disp: 1 Each, Rfl: 3    Blood-Glucose Transmitter (Dexcom G6 Transmitter) loy, Use as directed, Disp: 1 Device, Rfl: 3    insulin glargine (Basaglar KwikPen U-100 Insulin) 100 unit/mL (3 mL) inpn, 32 Units by SubCUTAneous route daily. , Disp: 10 Pen, Rfl: 3    insulin lispro (HUMALOG) 100 unit/mL kwikpen, Inject 12units subQ with each of 2 largest meals, Disp: 10 Pen, Rfl: 3    hydrOXYzine HCL (ATARAX) 50 mg tablet, Take 50 mg by mouth., Disp: , Rfl:       Past Surgical History:     Past Surgical History:   Procedure Laterality Date    HX APPENDECTOMY           Family History:     Family History   Problem Relation Age of Onset    Cancer Mother     Diabetes Mother     Diabetes Brother     Cancer Maternal Grandfather          Social History:     Social History     Socioeconomic History    Marital status: UNKNOWN     Spouse name: Not on file    Number of children: Not on file    Years of education: Not on file    Highest education level: Not on file   Occupational History    Not on file   Tobacco Use    Smoking status: Former     Years: 5.00     Types: Cigarettes    Smokeless tobacco: Never   Vaping Use    Vaping Use: Never used   Substance and Sexual Activity    Alcohol use: Yes    Drug use: Not on file    Sexual activity: Yes     Partners: Male     Birth control/protection: Condom   Other Topics Concern    Not on file   Social History Narrative    Not on file     Social Determinants of Health     Financial Resource Strain: Not on file   Food Insecurity: Not on file   Transportation Needs: Not on file   Physical Activity: Not on file   Stress: Not on file   Social Connections: Not on file   Intimate Partner Violence: Not on file   Housing Stability: Not on file            There were no vitals taken for this visit.    Physical Exam:   General - Well appearing  HEENT - eomi, no conjunctivitis, normal external ears and nose  Neck - normal appearing neck  Pulm - speaking in full sentences, no respiratory distress  Neuro-  Alert and oriented, No obvious focal deficits  Psych - normal mood and affect      Labs/Imaging:     Labs and imaging reviewed by me and significant for:    Lab Results   Component Value Date/Time    WBC 13.5 (H) 09/07/2020 07:05 AM    HGB 15.0 09/07/2020 07:05 AM    HCT 43.2 09/07/2020 07:05 AM    PLATELET 908 78/70/5325 07:05 AM    MCV 91.1 09/07/2020 07:05 AM     Lab Results   Component Value Date/Time    Sodium 134 (L) 09/07/2020 07:05 AM    Potassium 4.4 09/07/2020 07:05 AM    Chloride 104 09/07/2020 07:05 AM    CO2 22 09/07/2020 07:05 AM    Anion gap 8 09/07/2020 07:05 AM    Glucose 375 (H) 09/07/2020 07:05 AM    BUN 11 09/07/2020 07:05 AM    Creatinine 0.88 09/07/2020 07:05 AM    BUN/Creatinine ratio 13 09/07/2020 07:05 AM    GFR est AA >60 09/07/2020 07:05 AM    GFR est non-AA >60 09/07/2020 07:05 AM    Calcium 8.9 09/07/2020 07:05 AM    Bilirubin, total 0.3 09/07/2020 07:05 AM    Alk.  phosphatase 114 09/07/2020 07:05 AM    Protein, total 7.5 09/07/2020 07:05 AM    Albumin 4.3 09/07/2020 07:05 AM    Globulin 3.2 09/07/2020 07:05 AM    A-G Ratio 1.3 09/07/2020 07:05 AM    ALT (SGPT) 36 09/07/2020 07:05 AM    AST (SGOT) 50 (H) 09/07/2020 07:05 AM     Lab Results   Component Value Date/Time    Hemoglobin A1c (POC) 6.8 05/24/2021 11:06 AM    Hemoglobin A1c, External 6.8 05/15/2022 12:00 AM

## 2023-03-02 ENCOUNTER — APPOINTMENT (OUTPATIENT)
Dept: INTERNAL MEDICINE CLINIC | Age: 48
End: 2023-03-02

## 2023-04-18 ENCOUNTER — TELEPHONE (OUTPATIENT)
Dept: INTERNAL MEDICINE CLINIC | Age: 48
End: 2023-04-18

## 2023-04-18 NOTE — TELEPHONE ENCOUNTER
Attempted to submit referral through [employer]. Received response from insurance stating that a referral is not required since Dr. Jonathan Bravo is in network with patient's plan. Called and advised patient of this information. Patient understood and had no further questions.

## 2023-04-18 NOTE — TELEPHONE ENCOUNTER
See encounter from 3/2/23. The patient is requesting a referral for:    Provider: Dr. Tosin Miller   Address: [address] [place] [address].  Hira [address] [address]  Phone: [phone]  Fax: [phone]  Specialty: Endocrinology   Appt date: 5/18/23  Diagnosis: type 1 DM (E10.9)

## 2023-05-10 ENCOUNTER — TELEPHONE (OUTPATIENT)
Age: 48
End: 2023-05-10

## 2023-05-10 DIAGNOSIS — E10.9 TYPE 1 DIABETES MELLITUS WITHOUT COMPLICATIONS (HCC): Primary | ICD-10-CM

## 2023-05-10 NOTE — TELEPHONE ENCOUNTER
Regarding medication:  Freestyle wilber 2 sensors    Problem:  Cannot get into endocrinologist soon enough   Only 1 left  Should be qty 6 (3 mo supply)      Suggested potential alternate options:  Can dr Judson Rodriguez send in for patient please      [facility]/PHARMACY #[id number]- [place], [address] [facility] -  [phone] - F [phone]

## 2023-05-10 NOTE — TELEPHONE ENCOUNTER
PCP: Annie Shetty MD    Last appt: [unfilled]  No future appointments.     Requested Prescriptions     Pending Prescriptions Disp Refills    Continuous Blood Gluc Sensor (FREESTYLE JORGE 2 SENSOR) MISC 1 each 4     Sig: TAKE AS DIRECTED EVERY 14 DAYS

## 2023-05-22 RX ORDER — PREGABALIN 150 MG/1
150 CAPSULE ORAL 2 TIMES DAILY
COMMUNITY
Start: 2023-02-22 | End: 2023-06-29

## 2023-05-22 RX ORDER — CHOLECALCIFEROL (VITAMIN D3) 125 MCG
CAPSULE ORAL
COMMUNITY
End: 2023-06-29

## 2023-06-29 ENCOUNTER — OFFICE VISIT (OUTPATIENT)
Age: 48
End: 2023-06-29
Payer: MEDICARE

## 2023-06-29 VITALS
DIASTOLIC BLOOD PRESSURE: 85 MMHG | SYSTOLIC BLOOD PRESSURE: 131 MMHG | OXYGEN SATURATION: 99 % | TEMPERATURE: 98.8 F | RESPIRATION RATE: 18 BRPM | WEIGHT: 137.2 LBS | HEIGHT: 64 IN | BODY MASS INDEX: 23.42 KG/M2 | HEART RATE: 78 BPM

## 2023-06-29 DIAGNOSIS — M79.7 FIBROMYALGIA: ICD-10-CM

## 2023-06-29 DIAGNOSIS — E10.40 TYPE 1 DIABETES MELLITUS WITH DIABETIC NEUROPATHY (HCC): ICD-10-CM

## 2023-06-29 DIAGNOSIS — F10.90 ALCOHOL USE DISORDER: ICD-10-CM

## 2023-06-29 DIAGNOSIS — G89.4 CHRONIC PAIN SYNDROME: ICD-10-CM

## 2023-06-29 DIAGNOSIS — F33.42 RECURRENT MAJOR DEPRESSIVE DISORDER, IN FULL REMISSION (HCC): ICD-10-CM

## 2023-06-29 DIAGNOSIS — M65.332 TRIGGER MIDDLE FINGER OF LEFT HAND: Primary | ICD-10-CM

## 2023-06-29 DIAGNOSIS — F41.9 ANXIETY: ICD-10-CM

## 2023-06-29 PROBLEM — K31.84 GASTROPARESIS: Status: ACTIVE | Noted: 2023-06-29

## 2023-06-29 PROCEDURE — 99214 OFFICE O/P EST MOD 30 MIN: CPT | Performed by: STUDENT IN AN ORGANIZED HEALTH CARE EDUCATION/TRAINING PROGRAM

## 2023-06-29 PROCEDURE — 3052F HG A1C>EQUAL 8.0%<EQUAL 9.0%: CPT | Performed by: STUDENT IN AN ORGANIZED HEALTH CARE EDUCATION/TRAINING PROGRAM

## 2023-06-29 RX ORDER — CYCLOBENZAPRINE HCL 10 MG
10 TABLET ORAL 3 TIMES DAILY PRN
Qty: 15 TABLET | Refills: 3 | Status: SHIPPED | OUTPATIENT
Start: 2023-06-29

## 2023-06-29 ASSESSMENT — PATIENT HEALTH QUESTIONNAIRE - PHQ9
SUM OF ALL RESPONSES TO PHQ QUESTIONS 1-9: 0
2. FEELING DOWN, DEPRESSED OR HOPELESS: 0
1. LITTLE INTEREST OR PLEASURE IN DOING THINGS: 0
SUM OF ALL RESPONSES TO PHQ9 QUESTIONS 1 & 2: 0

## 2023-07-17 ENCOUNTER — TELEPHONE (OUTPATIENT)
Age: 48
End: 2023-07-17

## 2023-07-17 NOTE — TELEPHONE ENCOUNTER
----- Message from Raina Ochoa sent at 7/17/2023 10:09 AM EDT -----  Subject: Message to Provider    QUESTIONS  Information for Provider? Patient's insurance called in asking for a   expedited prior authorization as she is now showing out of network , and a   month for continued. Please call pt   ---------------------------------------------------------------------------  --------------  Marti STEPHENS  [phone]; OK to leave message on voicemail  ---------------------------------------------------------------------------  --------------  SCRIPT ANSWERS  Relationship to Patient? Covered Entity  Covered Entity Type? Health Insurance? Representative Name?  Garcia Uribe

## 2023-07-17 NOTE — TELEPHONE ENCOUNTER
----- Message from Darell Isidro sent at 7/17/2023 10:09 AM EDT -----  Subject: Message to Provider    QUESTIONS  Information for Provider? Patient's insurance called in asking for a   expedited prior authorization as she is now showing out of network , and a   month for continued. Please call pt   ---------------------------------------------------------------------------  --------------  Nicolle Cruz INFO  [phone]; OK to leave message on voicemail  ---------------------------------------------------------------------------  --------------  SCRIPT ANSWERS  Relationship to Patient? Covered Entity  Covered Entity Type? Health Insurance? Representative Name?  Mitchell Shetty

## 2023-07-17 NOTE — TELEPHONE ENCOUNTER
----- Message from Heather Marin sent at 7/17/2023 10:09 AM EDT -----  Subject: Message to Provider    QUESTIONS  Information for Provider? Patient's insurance called in asking for a   expedited prior authorization as she is now showing out of network , and a   month for continued. Please call pt   ---------------------------------------------------------------------------  --------------  Heather STEPHENS  [phone]; OK to leave message on voicemail  ---------------------------------------------------------------------------  --------------  SCRIPT ANSWERS  Relationship to Patient? Covered Entity  Covered Entity Type? Health Insurance? Representative Name?  Obdulia Kendrick

## 2023-07-19 ENCOUNTER — TELEPHONE (OUTPATIENT)
Age: 48
End: 2023-07-19

## 2023-07-19 NOTE — TELEPHONE ENCOUNTER
Pt states that she needs her work accomodation letter redone for this year. ADA accomodation. Pt states it should be in her chart and only dates need to be changed. Pt states she does not need an  appt for this. Call pt to let her know.

## 2023-07-20 NOTE — TELEPHONE ENCOUNTER
Paperwork filled out. Signed. Scanned and mailed to pt. PT called. 2 identifiers confirmed. Notified that everything was completed. No further concerns voiced.

## 2023-08-08 ENCOUNTER — TELEPHONE (OUTPATIENT)
Age: 48
End: 2023-08-08

## 2023-08-08 NOTE — TELEPHONE ENCOUNTER
Pt states that she attached and left message on her My Chart account. Pt states if you have to charge to get these redone that is fine. She has to have these done and back in by 8-12-23. Please call pt with any questions. Pt is very apologetic that the forms were changed.

## 2023-08-11 ENCOUNTER — TELEPHONE (OUTPATIENT)
Age: 48
End: 2023-08-11

## 2023-08-11 NOTE — TELEPHONE ENCOUNTER
Called patient twice, no answer. LVM for patient to give us a return call. Per patient    Pt states that she attached and left message on her My Chart account. Pt states if you have to charge to get these redone that is fine. She has to have these done and back in by 8-12-23. Please call pt with any questions. Pt is very apologetic that the forms were tolliver.

## 2023-08-11 NOTE — TELEPHONE ENCOUNTER
Patient states she needs a call back in reference to [employer] Forms Due tomorrow, 8/12/23 with her Employer that patient  states she has been working on completion since July. Please call.  Thank you

## 2023-08-11 NOTE — TELEPHONE ENCOUNTER
Dr Margaret Lin has been out of the office all week. It takes 7-14 for it to be done. I will forward you message to Dr. Margaret Lin. Patient requesting formes sent through [employer] to be completed. The due date is 08/12/23.

## 2023-08-11 NOTE — TELEPHONE ENCOUNTER
Called patient twice, no answer. LVM for patient to give us a return call. Per patient     Pt states that she attached and left message on her My Chart account. Pt states if you have to charge to get these redone that is fine. She has to have these done and back in by 8-12-23.

## 2023-08-18 ENCOUNTER — TELEPHONE (OUTPATIENT)
Age: 48
End: 2023-08-18

## 2023-08-18 NOTE — TELEPHONE ENCOUNTER
Calling for clarification on page 3 of forms. States sees check marks for frequency/duration but needs numbers.     Can you call to clarify please

## 2024-01-18 ENCOUNTER — TELEPHONE (OUTPATIENT)
Age: 49
End: 2024-01-18

## 2024-01-18 NOTE — TELEPHONE ENCOUNTER
Hazel from [facility], dr Crowley  Phone [phone]  Fax [phone]    States needs copy of the ada form from sep/aug.    Pt has a signed JUAN ALBERTO releasing all records to dr Matilda Morris (9/22/23)    PSR faxed the requested record.

## 2024-02-16 LAB
ESTIMATED AVERAGE GLUCOSE: NORMAL
HBA1C MFR BLD: 7.9 %

## 2024-08-09 ENCOUNTER — OFFICE VISIT (OUTPATIENT)
Facility: CLINIC | Age: 49
End: 2024-08-09

## 2024-08-09 VITALS
HEART RATE: 83 BPM | SYSTOLIC BLOOD PRESSURE: 121 MMHG | DIASTOLIC BLOOD PRESSURE: 78 MMHG | HEIGHT: 64 IN | OXYGEN SATURATION: 98 % | WEIGHT: 138.8 LBS | RESPIRATION RATE: 16 BRPM | TEMPERATURE: 97 F | BODY MASS INDEX: 23.7 KG/M2

## 2024-08-09 DIAGNOSIS — M79.7 FIBROMYALGIA: ICD-10-CM

## 2024-08-09 DIAGNOSIS — Z71.89 ACP (ADVANCE CARE PLANNING): ICD-10-CM

## 2024-08-09 DIAGNOSIS — Z00.00 MEDICARE ANNUAL WELLNESS VISIT, SUBSEQUENT: Primary | ICD-10-CM

## 2024-08-09 DIAGNOSIS — E10.40 TYPE 1 DIABETES MELLITUS WITH DIABETIC NEUROPATHY (HCC): ICD-10-CM

## 2024-08-09 DIAGNOSIS — Z12.31 SCREENING MAMMOGRAM FOR BREAST CANCER: ICD-10-CM

## 2024-08-09 LAB — HBA1C MFR BLD: 7.6 %

## 2024-08-09 RX ORDER — SERTRALINE HYDROCHLORIDE 25 MG/1
25 TABLET, FILM COATED ORAL DAILY
COMMUNITY

## 2024-08-09 RX ORDER — PREGABALIN 150 MG/1
150 CAPSULE ORAL 2 TIMES DAILY
COMMUNITY

## 2024-08-09 RX ORDER — IBUPROFEN 600 MG/1
600 TABLET ORAL EVERY 6 HOURS PRN
COMMUNITY
Start: 2024-07-17

## 2024-08-09 RX ORDER — CYCLOBENZAPRINE HCL 10 MG
20 TABLET ORAL 2 TIMES DAILY PRN
Qty: 30 TABLET | Refills: 1 | Status: SHIPPED | OUTPATIENT
Start: 2024-08-09

## 2024-08-09 RX ORDER — OXCARBAZEPINE 150 MG/1
150 TABLET, FILM COATED ORAL 2 TIMES DAILY
COMMUNITY
Start: 2024-08-08

## 2024-08-09 RX ORDER — PROMETHAZINE HYDROCHLORIDE 12.5 MG/1
12.5 TABLET ORAL EVERY 8 HOURS PRN
COMMUNITY
Start: 2024-07-24 | End: 2024-08-23

## 2024-08-09 SDOH — ECONOMIC STABILITY: FOOD INSECURITY: WITHIN THE PAST 12 MONTHS, THE FOOD YOU BOUGHT JUST DIDN'T LAST AND YOU DIDN'T HAVE MONEY TO GET MORE.: NEVER TRUE

## 2024-08-09 SDOH — ECONOMIC STABILITY: FOOD INSECURITY: WITHIN THE PAST 12 MONTHS, YOU WORRIED THAT YOUR FOOD WOULD RUN OUT BEFORE YOU GOT MONEY TO BUY MORE.: NEVER TRUE

## 2024-08-09 SDOH — ECONOMIC STABILITY: INCOME INSECURITY: HOW HARD IS IT FOR YOU TO PAY FOR THE VERY BASICS LIKE FOOD, HOUSING, MEDICAL CARE, AND HEATING?: NOT HARD AT ALL

## 2024-08-09 SDOH — ECONOMIC STABILITY: HOUSING INSECURITY
IN THE LAST 12 MONTHS, WAS THERE A TIME WHEN YOU DID NOT HAVE A STEADY PLACE TO SLEEP OR SLEPT IN A SHELTER (INCLUDING NOW)?: NO

## 2024-08-09 ASSESSMENT — PATIENT HEALTH QUESTIONNAIRE - PHQ9
6. FEELING BAD ABOUT YOURSELF - OR THAT YOU ARE A FAILURE OR HAVE LET YOURSELF OR YOUR FAMILY DOWN: NOT AT ALL
7. TROUBLE CONCENTRATING ON THINGS, SUCH AS READING THE NEWSPAPER OR WATCHING TELEVISION: NEARLY EVERY DAY
3. TROUBLE FALLING OR STAYING ASLEEP: SEVERAL DAYS
SUM OF ALL RESPONSES TO PHQ QUESTIONS 1-9: 7
1. LITTLE INTEREST OR PLEASURE IN DOING THINGS: NOT AT ALL
SUM OF ALL RESPONSES TO PHQ9 QUESTIONS 1 & 2: 0
SUM OF ALL RESPONSES TO PHQ QUESTIONS 1-9: 7
8. MOVING OR SPEAKING SO SLOWLY THAT OTHER PEOPLE COULD HAVE NOTICED. OR THE OPPOSITE, BEING SO FIGETY OR RESTLESS THAT YOU HAVE BEEN MOVING AROUND A LOT MORE THAN USUAL: NOT AT ALL
4. FEELING TIRED OR HAVING LITTLE ENERGY: NOT AT ALL
5. POOR APPETITE OR OVEREATING: NEARLY EVERY DAY
2. FEELING DOWN, DEPRESSED OR HOPELESS: NOT AT ALL
10. IF YOU CHECKED OFF ANY PROBLEMS, HOW DIFFICULT HAVE THESE PROBLEMS MADE IT FOR YOU TO DO YOUR WORK, TAKE CARE OF THINGS AT HOME, OR GET ALONG WITH OTHER PEOPLE: NOT DIFFICULT AT ALL
9. THOUGHTS THAT YOU WOULD BE BETTER OFF DEAD, OR OF HURTING YOURSELF: NOT AT ALL

## 2024-08-09 ASSESSMENT — LIFESTYLE VARIABLES: HOW OFTEN DO YOU HAVE A DRINK CONTAINING ALCOHOL: 2-3 TIMES A WEEK

## 2024-08-09 ASSESSMENT — ANXIETY QUESTIONNAIRES
5. BEING SO RESTLESS THAT IT IS HARD TO SIT STILL: NEARLY EVERY DAY
6. BECOMING EASILY ANNOYED OR IRRITABLE: NEARLY EVERY DAY
7. FEELING AFRAID AS IF SOMETHING AWFUL MIGHT HAPPEN: MORE THAN HALF THE DAYS
1. FEELING NERVOUS, ANXIOUS, OR ON EDGE: NEARLY EVERY DAY
2. NOT BEING ABLE TO STOP OR CONTROL WORRYING: SEVERAL DAYS
IF YOU CHECKED OFF ANY PROBLEMS ON THIS QUESTIONNAIRE, HOW DIFFICULT HAVE THESE PROBLEMS MADE IT FOR YOU TO DO YOUR WORK, TAKE CARE OF THINGS AT HOME, OR GET ALONG WITH OTHER PEOPLE: NOT DIFFICULT AT ALL
3. WORRYING TOO MUCH ABOUT DIFFERENT THINGS: SEVERAL DAYS
4. TROUBLE RELAXING: NEARLY EVERY DAY
GAD7 TOTAL SCORE: 16

## 2024-08-09 ASSESSMENT — ENCOUNTER SYMPTOMS
ABDOMINAL PAIN: 0
SHORTNESS OF BREATH: 0

## 2024-08-09 NOTE — PROGRESS NOTES
Medicare Annual Wellness Visit    Anai Mccormack is here for New Patient, Diabetes, Anxiety, Depression, and Medicare AWV    Assessment & Plan   Type 1 diabetes mellitus with diabetic neuropathy (HCC)  -     AMB POC HEMOGLOBIN A1C  -     Comprehensive Metabolic Panel; Future  -     Lipid Panel; Future  -     Microalbumin / Creatinine Urine Ratio; Future  Fibromyalgia  -     cyclobenzaprine (FLEXERIL) 10 MG tablet; Take 2 tablets by mouth 2 times daily as needed for Muscle spasms (fibromyalgia), Disp-30 tablet, R-1Normal  Screening mammogram for breast cancer  -     [facility] ANGEL DIGITAL SCREEN BILATERAL; Future  ACP (advance care planning)  -     [place] Advanced Care Planning (16-30 minutes) [71252]  -     [place] Advanced Care Planning (30+ minutes) [58016]  Medicare annual wellness visit, subsequent    Recommendations for Preventive Services Due: see orders and patient instructions/AVS.  Recommended screening schedule for the next 5-10 years is provided to the patient in written form: see Patient Instructions/AVS.     Return in about 3 months (around 11/9/2024) for Treatment monitoring, Refills.     Subjective       Patient's complete Health Risk Assessment and screening values have been reviewed and are found in Flowsheets. The following problems were reviewed today and where indicated follow up appointments were made and/or referrals ordered.    Positive Risk Factor Screenings with Interventions:        Depression:  PHQ-2 Score: 0  PHQ-9 Total Score: 7  Total Score Interpretation: 5-9 = mild depression  Interventions:  See AVS for additional education material     Drug Use:   Substance and Sexual Activity   Drug Use Yes    Types: Marijuana (Weed)    Comment: I have held a medical marijuana card from 2016-20219     Interventions:  See AVS for additional education material       Self-assessment of health:  In general, how would you say your health is?: (!) Poor    Interventions:  See AVS for additional education

## 2024-08-09 NOTE — PROGRESS NOTES
Subjective:      Patient ID: Anai Mccormack is a 48 y.o. female.    Patient comes to establish PCP.    Patient is complaining of fibromyalgia symptoms for several years.  She is taking muscle relaxant Flexeril 20 mg twice a day as needed.  Her fibromyalgia symptoms are mostly controlled, she also also active and she likes to dance regularly and have long walks on the beach.  Can continue Flexeril, provided advice is to avoid accidents.  She denies heavy [place] drinking behavior.  She does not drive on the regular basis.  Patient was instructed not to drive or do activities that can put her or others at risk while taking this medication since it can cause drowsiness and sleepiness in order to avoid accidents.  Patient understood and agreed with the plan.  Following up with endocrinology for diabetes mellitus type 1.  A1c POC 7.6  Following up with rheumatology, prescribed pregabalin for neuropathy.  She also has a psychiatrist every 2 weeks appointments for bipolar depression.  Denies fever chills sweats chest pain shortness of breath.  She has no other complaints or concerns.        Diabetes mellitus type 1  insulin glargine (BASAGLAR KWIKPEN) 100 UNIT/ML injection pen, Inject 24 Units into the skin daily, Disp: , Rfl:   insulin lispro, 1 Unit Dial, (HUMALOG/ADMELOG) 100 UNIT/ML SOPN, Inject 6 to 6 units subQ with each of 2 largest meals.  [facility] - Dr. Slaughter. Every 3 month. Hart.    Diabetic neuropathy  pregabalin (LYRICA) 150 MG capsule, bid  [facility] Dr. Brendon Grey    Bipolar type II - Depression disorder  sertraline (ZOLOFT) 25 MG tablet, daily.  OXcarbazepine (TRILEPTAL) 150 MG tablet, bid.  [facility] - [facility]. Every 2 weeks.    Fibromyalgia  Declined physical therapy.  Patient dances all her life including salsa.  She is active and likes to have long walks in the beach.  She is a professional dancer and still work dancing.  ibuprofen (ADVIL;MOTRIN) 600 MG

## 2024-08-10 LAB
ALBUMIN SERPL-MCNC: 3.8 G/DL (ref 3.9–4.9)
ALBUMIN/CREAT UR: 13 MG/G CREAT (ref 0–29)
ALP SERPL-CCNC: 80 IU/L (ref 44–121)
ALT SERPL-CCNC: 13 IU/L (ref 0–32)
AST SERPL-CCNC: 19 IU/L (ref 0–40)
BILIRUB SERPL-MCNC: 0.3 MG/DL (ref 0–1.2)
BUN SERPL-MCNC: 10 MG/DL (ref 6–24)
BUN/CREAT SERPL: 17 (ref 9–23)
CALCIUM SERPL-MCNC: 9.1 MG/DL (ref 8.7–10.2)
CHLORIDE SERPL-SCNC: 106 MMOL/L (ref 96–106)
CHOLEST SERPL-MCNC: 195 MG/DL (ref 100–199)
CO2 SERPL-SCNC: 20 MMOL/L (ref 20–29)
CREAT SERPL-MCNC: 0.6 MG/DL (ref 0.57–1)
CREAT UR-MCNC: 111.2 MG/DL
EGFRCR SERPLBLD CKD-EPI 2021: 111 ML/MIN/1.73
GLOBULIN SER CALC-MCNC: 2.3 G/DL (ref 1.5–4.5)
GLUCOSE SERPL-MCNC: 62 MG/DL (ref 70–99)
HDLC SERPL-MCNC: 67 MG/DL
LDLC SERPL CALC-MCNC: 117 MG/DL (ref 0–99)
MICROALBUMIN UR-MCNC: 13.9 UG/ML
POTASSIUM SERPL-SCNC: 4.6 MMOL/L (ref 3.5–5.2)
PROT SERPL-MCNC: 6.1 G/DL (ref 6–8.5)
SODIUM SERPL-SCNC: 141 MMOL/L (ref 134–144)
SPECIMEN STATUS REPORT: NORMAL
TRIGL SERPL-MCNC: 61 MG/DL (ref 0–149)
VLDLC SERPL CALC-MCNC: 11 MG/DL (ref 5–40)

## 2024-08-29 ENCOUNTER — HOSPITAL ENCOUNTER (OUTPATIENT)
Dept: WOMENS IMAGING | Facility: HOSPITAL | Age: 49
Discharge: HOME | End: 2024-08-29
Payer: MEDICARE

## 2024-08-29 DIAGNOSIS — Z12.31 SCREENING MAMMOGRAM FOR BREAST CANCER: ICD-10-CM

## 2024-08-29 PROCEDURE — 77063 BREAST TOMOSYNTHESIS BI: CPT

## 2024-09-08 DIAGNOSIS — Z12.31 SCREENING MAMMOGRAM FOR BREAST CANCER: Primary | ICD-10-CM

## 2024-09-11 RX ORDER — PROMETHAZINE HYDROCHLORIDE 12.5 MG/1
12.5 TABLET ORAL EVERY 4 HOURS PRN
COMMUNITY
End: 2024-09-11 | Stop reason: SDUPTHER

## 2024-09-11 RX ORDER — PROMETHAZINE HYDROCHLORIDE 12.5 MG/1
12.5 TABLET ORAL DAILY PRN
Qty: 90 TABLET | Refills: 0 | Status: SHIPPED | OUTPATIENT
Start: 2024-09-11

## 2024-11-07 ENCOUNTER — OFFICE VISIT (OUTPATIENT)
Facility: CLINIC | Age: 49
End: 2024-11-07
Payer: MEDICARE

## 2024-11-07 VITALS
HEIGHT: 64 IN | SYSTOLIC BLOOD PRESSURE: 138 MMHG | WEIGHT: 133.4 LBS | DIASTOLIC BLOOD PRESSURE: 86 MMHG | TEMPERATURE: 97.1 F | RESPIRATION RATE: 16 BRPM | BODY MASS INDEX: 22.77 KG/M2 | HEART RATE: 98 BPM

## 2024-11-07 DIAGNOSIS — M79.7 FIBROMYALGIA: ICD-10-CM

## 2024-11-07 DIAGNOSIS — M62.838 MUSCLE SPASM: Primary | ICD-10-CM

## 2024-11-07 PROCEDURE — 99213 OFFICE O/P EST LOW 20 MIN: CPT | Performed by: INTERNAL MEDICINE

## 2024-11-07 RX ORDER — METHOCARBAMOL 500 MG/1
500 TABLET, FILM COATED ORAL 3 TIMES DAILY PRN
Qty: 30 TABLET | Refills: 0 | Status: SHIPPED | OUTPATIENT
Start: 2024-11-07 | End: 2024-11-17

## 2024-11-07 ASSESSMENT — ENCOUNTER SYMPTOMS: SHORTNESS OF BREATH: 0

## 2024-11-07 NOTE — PROGRESS NOTES
prn.  By previous PCP.        PAST MEDICAL HISTORY  Medical: as listed.  Surgical: appendectomy, tonsillectomy.  Allergies: as listed.  Medication: as listed.  Family: mom liver cancer, grand father had lung cancer.  Work: dancer, . Masters in education.  Social: tobacco denied, [place] 4 glasses of wine per week if going out, recreational drugs marijuana daily.  Sexual: single, 1 child, STDs denied.      Diabetes  Pertinent negatives for diabetes include no chest pain.   Anxiety  Patient reports no chest pain or shortness of breath.       DepressionPatient is not experiencing: shortness of breath.          Review of Systems   Respiratory:  Negative for shortness of breath.    Cardiovascular:  Negative for chest pain.   Psychiatric/Behavioral:  Positive for depression.        Objective:   Visit Vitals  /86 (Site: Right Upper Arm, Position: Sitting)   Pulse 98   Temp 97.1 °F (36.2 °C) (Temporal)   Resp 16   Ht 1.626 m (5' 4\")   Wt 60.5 kg (133 lb 6.4 oz)   BMI 22.90 kg/m²        Physical Exam  Cardiovascular:      Rate and Rhythm: Normal rate and regular rhythm.      Heart sounds: S1 normal and S2 normal.      No S3 or S4 sounds.   Pulmonary:      Effort: Pulmonary effort is normal.      Breath sounds: Normal breath sounds.      Comments: No crackles or wheezing.  Abdominal:      General: Abdomen is flat. Bowel sounds are normal.   Neurological:      Mental Status: She is alert.         Assessment:       ICD-10-CM    1. Muscle spasm  M62.838 methocarbamol (ROBAXIN) 500 MG tablet      2. Fibromyalgia  M79.7 methocarbamol (ROBAXIN) 500 MG tablet              Plan:   Return in about 3 months (around 2/7/2025) for Follow up, Treatment monitoring.      Jose Galvan MD

## 2024-11-18 NOTE — TELEPHONE ENCOUNTER
Last Appointment:  11/7/2024  Future Appointments   Date Time Provider Department Center   1/23/2025 10:30 AM Jose Gordon MD [facility]

## 2024-11-19 RX ORDER — PROMETHAZINE HYDROCHLORIDE 12.5 MG/1
12.5 TABLET ORAL DAILY PRN
Qty: 90 TABLET | Refills: 0 | Status: SHIPPED | OUTPATIENT
Start: 2024-11-19

## 2024-11-25 ENCOUNTER — COMMUNITY OUTREACH (OUTPATIENT)
Facility: CLINIC | Age: 49
End: 2024-11-25

## 2024-12-05 DIAGNOSIS — M79.7 FIBROMYALGIA: ICD-10-CM

## 2024-12-05 RX ORDER — PROMETHAZINE HYDROCHLORIDE 12.5 MG/1
12.5 TABLET ORAL DAILY PRN
Qty: 90 TABLET | Refills: 0 | Status: SHIPPED | OUTPATIENT
Start: 2024-12-05

## 2024-12-05 RX ORDER — PROMETHAZINE HYDROCHLORIDE 12.5 MG/1
12.5 TABLET ORAL DAILY PRN
Qty: 90 TABLET | Refills: 0 | OUTPATIENT
Start: 2024-12-05

## 2024-12-05 RX ORDER — CYCLOBENZAPRINE HCL 10 MG
10 TABLET ORAL 2 TIMES DAILY PRN
Qty: 30 TABLET | Refills: 1 | Status: SHIPPED | OUTPATIENT
Start: 2024-12-05

## 2025-01-20 SDOH — ECONOMIC STABILITY: FOOD INSECURITY: WITHIN THE PAST 12 MONTHS, THE FOOD YOU BOUGHT JUST DIDN'T LAST AND YOU DIDN'T HAVE MONEY TO GET MORE.: SOMETIMES TRUE

## 2025-01-20 SDOH — ECONOMIC STABILITY: INCOME INSECURITY: IN THE LAST 12 MONTHS, WAS THERE A TIME WHEN YOU WERE NOT ABLE TO PAY THE MORTGAGE OR RENT ON TIME?: NO

## 2025-01-20 SDOH — ECONOMIC STABILITY: TRANSPORTATION INSECURITY
IN THE PAST 12 MONTHS, HAS LACK OF TRANSPORTATION KEPT YOU FROM MEETINGS, WORK, OR FROM GETTING THINGS NEEDED FOR DAILY LIVING?: NO

## 2025-01-20 SDOH — ECONOMIC STABILITY: FOOD INSECURITY: WITHIN THE PAST 12 MONTHS, YOU WORRIED THAT YOUR FOOD WOULD RUN OUT BEFORE YOU GOT MONEY TO BUY MORE.: OFTEN TRUE

## 2025-01-20 SDOH — ECONOMIC STABILITY: TRANSPORTATION INSECURITY
IN THE PAST 12 MONTHS, HAS THE LACK OF TRANSPORTATION KEPT YOU FROM MEDICAL APPOINTMENTS OR FROM GETTING MEDICATIONS?: NO

## 2025-01-23 ENCOUNTER — OFFICE VISIT (OUTPATIENT)
Facility: CLINIC | Age: 50
End: 2025-01-23

## 2025-01-23 VITALS
OXYGEN SATURATION: 97 % | HEART RATE: 84 BPM | DIASTOLIC BLOOD PRESSURE: 78 MMHG | WEIGHT: 132.6 LBS | BODY MASS INDEX: 22.64 KG/M2 | SYSTOLIC BLOOD PRESSURE: 126 MMHG | TEMPERATURE: 97.4 F | HEIGHT: 64 IN

## 2025-01-23 DIAGNOSIS — Z71.89 ACP (ADVANCE CARE PLANNING): ICD-10-CM

## 2025-01-23 DIAGNOSIS — M79.7 FIBROMYALGIA: ICD-10-CM

## 2025-01-23 DIAGNOSIS — M62.838 MUSCLE SPASM: ICD-10-CM

## 2025-01-23 DIAGNOSIS — Z00.00 MEDICARE ANNUAL WELLNESS VISIT, SUBSEQUENT: Primary | ICD-10-CM

## 2025-01-23 RX ORDER — CYCLOBENZAPRINE HCL 10 MG
10 TABLET ORAL 2 TIMES DAILY PRN
Qty: 60 TABLET | Refills: 1 | Status: SHIPPED | OUTPATIENT
Start: 2025-01-23

## 2025-01-23 RX ORDER — OXCARBAZEPINE 150 MG/1
150 TABLET, FILM COATED ORAL 2 TIMES DAILY
Qty: 60 TABLET | Refills: 1 | Status: CANCELLED | OUTPATIENT
Start: 2025-01-23

## 2025-01-23 RX ORDER — IBUPROFEN 400 MG/1
400 TABLET, FILM COATED ORAL 2 TIMES DAILY PRN
Qty: 60 TABLET | Refills: 1 | Status: SHIPPED | OUTPATIENT
Start: 2025-01-23

## 2025-01-23 RX ORDER — BACLOFEN 10 MG/1
10 TABLET ORAL 3 TIMES DAILY
Qty: 90 TABLET | Refills: 1 | Status: SHIPPED | OUTPATIENT
Start: 2025-01-23

## 2025-01-23 SDOH — ECONOMIC STABILITY: FOOD INSECURITY: WITHIN THE PAST 12 MONTHS, YOU WORRIED THAT YOUR FOOD WOULD RUN OUT BEFORE YOU GOT MONEY TO BUY MORE.: SOMETIMES TRUE

## 2025-01-23 SDOH — ECONOMIC STABILITY: FOOD INSECURITY: WITHIN THE PAST 12 MONTHS, THE FOOD YOU BOUGHT JUST DIDN'T LAST AND YOU DIDN'T HAVE MONEY TO GET MORE.: SOMETIMES TRUE

## 2025-01-23 SDOH — HEALTH STABILITY: PHYSICAL HEALTH: ON AVERAGE, HOW MANY DAYS PER WEEK DO YOU ENGAGE IN MODERATE TO STRENUOUS EXERCISE (LIKE A BRISK WALK)?: 0 DAYS

## 2025-01-23 SDOH — HEALTH STABILITY: PHYSICAL HEALTH: ON AVERAGE, HOW MANY MINUTES DO YOU ENGAGE IN EXERCISE AT THIS LEVEL?: 0 MIN

## 2025-01-23 ASSESSMENT — PATIENT HEALTH QUESTIONNAIRE - PHQ9
3. TROUBLE FALLING OR STAYING ASLEEP: NOT AT ALL
4. FEELING TIRED OR HAVING LITTLE ENERGY: NOT AT ALL
10. IF YOU CHECKED OFF ANY PROBLEMS, HOW DIFFICULT HAVE THESE PROBLEMS MADE IT FOR YOU TO DO YOUR WORK, TAKE CARE OF THINGS AT HOME, OR GET ALONG WITH OTHER PEOPLE: NOT DIFFICULT AT ALL
SUM OF ALL RESPONSES TO PHQ QUESTIONS 1-9: 0
1. LITTLE INTEREST OR PLEASURE IN DOING THINGS: NOT AT ALL
SUM OF ALL RESPONSES TO PHQ QUESTIONS 1-9: 0
SUM OF ALL RESPONSES TO PHQ QUESTIONS 1-9: 0
8. MOVING OR SPEAKING SO SLOWLY THAT OTHER PEOPLE COULD HAVE NOTICED. OR THE OPPOSITE, BEING SO FIGETY OR RESTLESS THAT YOU HAVE BEEN MOVING AROUND A LOT MORE THAN USUAL: NOT AT ALL
SUM OF ALL RESPONSES TO PHQ QUESTIONS 1-9: 0
6. FEELING BAD ABOUT YOURSELF - OR THAT YOU ARE A FAILURE OR HAVE LET YOURSELF OR YOUR FAMILY DOWN: NOT AT ALL
7. TROUBLE CONCENTRATING ON THINGS, SUCH AS READING THE NEWSPAPER OR WATCHING TELEVISION: NOT AT ALL
2. FEELING DOWN, DEPRESSED OR HOPELESS: NOT AT ALL
5. POOR APPETITE OR OVEREATING: NOT AT ALL
SUM OF ALL RESPONSES TO PHQ9 QUESTIONS 1 & 2: 0
SUM OF ALL RESPONSES TO PHQ QUESTIONS 1-9: 0
SUM OF ALL RESPONSES TO PHQ9 QUESTIONS 1 & 2: 0
1. LITTLE INTEREST OR PLEASURE IN DOING THINGS: NOT AT ALL
SUM OF ALL RESPONSES TO PHQ QUESTIONS 1-9: 0
9. THOUGHTS THAT YOU WOULD BE BETTER OFF DEAD, OR OF HURTING YOURSELF: NOT AT ALL
SUM OF ALL RESPONSES TO PHQ QUESTIONS 1-9: 0
SUM OF ALL RESPONSES TO PHQ QUESTIONS 1-9: 0
2. FEELING DOWN, DEPRESSED OR HOPELESS: NOT AT ALL

## 2025-01-23 ASSESSMENT — ENCOUNTER SYMPTOMS
ABDOMINAL PAIN: 0
SHORTNESS OF BREATH: 0

## 2025-01-23 ASSESSMENT — LIFESTYLE VARIABLES
HOW OFTEN DO YOU HAVE A DRINK CONTAINING ALCOHOL: MONTHLY OR LESS
HOW OFTEN DO YOU HAVE SIX OR MORE DRINKS ON ONE OCCASION: 1
HOW MANY STANDARD DRINKS CONTAINING ALCOHOL DO YOU HAVE ON A TYPICAL DAY: PATIENT DECLINED
HOW MANY STANDARD DRINKS CONTAINING ALCOHOL DO YOU HAVE ON A TYPICAL DAY: 1 OR 2
HOW OFTEN DO YOU HAVE A DRINK CONTAINING ALCOHOL: 3
HOW MANY STANDARD DRINKS CONTAINING ALCOHOL DO YOU HAVE ON A TYPICAL DAY: 98
HOW OFTEN DO YOU HAVE A DRINK CONTAINING ALCOHOL: 2-4 TIMES A MONTH

## 2025-01-23 NOTE — PROGRESS NOTES
Medicare Annual Wellness Visit    Anai Mccormack is here for Medicare AWV    Assessment & Plan   Medicare annual wellness visit, subsequent  ACP (advance care planning)  -     [place] Advanced Care Planning (16-30 minutes) [30062]     No follow-ups on file.     Subjective       Patient's complete Health Risk Assessment and screening values have been reviewed and are found in Flowsheets. The following problems were reviewed today and where indicated follow up appointments were made and/or referrals ordered.    Positive Risk Factor Screenings with Interventions:        Drug Use:   Substance and Sexual Activity   Drug Use Yes    Types: Marijuana (Weed)    Comment: I have held a medical marijuana card from 2016-20219     Interventions:  See AVS for additional education material       Self-assessment of health:  In general, how would you say your health is?: (!) Poor    Interventions:  See AVS for additional education material    General HRA Questions:  Select all that apply: (!) New or Increased Pain, Stress  Interventions - Pain:  See AVS for additional education material  Interventions - Stress:  See AVS for additional education material      Inactivity:  On average, how many days per week do you engage in moderate to strenuous exercise (like a brisk walk)?: 0 days (!) Abnormal  On average, how many minutes do you engage in exercise at this level?: 0 min  Interventions:  See AVS for additional education material         Safety:  Do you have non-slip mats or non-slip surfaces or shower bars or grab bars in your shower or bathtub?: (!) No  Interventions:  See AVS for additional education material    ADL's:   Patient reports needing help with:  Select all that apply: (!) Laundry, Housekeeping, Shopping, Telephone Use, Food Preparation, Transportation  Interventions:  See AVS for additional education material                  Objective   Vitals:    01/23/25 1108   Pulse: 84   Temp: 97.4 °F (36.3 °C)   TempSrc: Temporal

## 2025-01-23 NOTE — PATIENT INSTRUCTIONS
think you are having a problem with your medicine.     If your doctor recommends aspirin, take the amount directed each day. Make sure you take aspirin and not another kind of pain reliever, such as acetaminophen (Tylenol).   When should you call for help?   Call 911 if you have symptoms of a heart attack. These may include:    Chest pain or pressure, or a strange feeling in the chest.     Sweating.     Shortness of breath.     Pain, pressure, or a strange feeling in the back, neck, jaw, or upper belly or in one or both shoulders or arms.     Lightheadedness or sudden weakness.     A fast or irregular heartbeat.   After you call 911, the  may tell you to chew 1 adult-strength or 2 to 4 low-dose aspirin. Wait for an ambulance. Do not try to drive yourself.  Watch closely for changes in your health, and be sure to contact your doctor if you have any problems.  Where can you learn more?  Go to https://www.[employer].net/patientEd and enter F075 to learn more about \"A Healthy Heart: Care Instructions.\"  Current as of: July 31, 2024  Content Version: 14.3  © 2024 [employer].   Care instructions adapted under license by [employer]. If you have questions about a medical condition or this instruction, always ask your healthcare professional. [employer], disclaims any warranty or liability for your use of this information.    Personalized Preventive Plan for Anai Mccormack - 1/23/2025  Medicare offers a range of preventive health benefits. Some of the tests and screenings are paid in full while other may be subject to a deductible, co-insurance, and/or copay.  Some of these benefits include a comprehensive review of your medical history including lifestyle, illnesses that may run in your family, and various assessments and screenings as appropriate.  After reviewing your medical record and screening and assessments performed today your provider may have ordered immunizations, labs,

## 2025-01-23 NOTE — PROGRESS NOTES
Subjective:      Patient ID: Anai Mccormack is a 49 y.o. female.    Follow up    Patient would like to adjust her medication for fibromyalgia.  She is experiencing muscle spasms taking Flexeril up to 3 times a day to control.  She tried methocarbamol before without significant improvement.  We discussed the treatment baclofen 3 times a day.  Will discuss secondary effects allergic reactions.  If any to go to ED patient agreed.  Patient will take baclofen 3 times a day.  Continue taking Flexeril 10 mg twice a day as needed for muscle spasm.  She can take ibuprofen 400 mg twice daily with meals and drinking 2 L of water daily.  Patient was instructed not to drive or do activities that can put her or others at risk while taking this medication since it can cause drowsiness and sleepiness in order to avoid accidents.  Patient understood and agreed with the plan.  She has an appointment with psychiatry later today.    Patient would like me to renew her work accommodation letter for her work.  Patient works from home with her computer.  Patient take phone calls and enter information.  Company is called [employer] [place], [place].   Working same company since 2021.  As usual schedule for Saturday to Wednesday.  Starts working 10:30 am to 2:30 pm  Thursdays and Fridays off for medical appointment and  arts.          Diabetes mellitus type 1  insulin glargine (BASAGLAR KWIKPEN) 100 UNIT/ML injection pen, Inject 24 Units into the skin daily, Disp: , Rfl:   insulin lispro, 1 Unit Dial, (HUMALOG/ADMELOG) 100 UNIT/ML SOPN, Inject 6 to 6 units subQ with each of 2 largest meals.  [facility] - Dr. Slaughter. Every 3 month. [facility].    Diabetic neuropathy  pregabalin (LYRICA) 150 MG capsule, bid  [facility] Dr. Brendon Grey    Bipolar type II - Depression disorder  sertraline (ZOLOFT) 25 MG tablet, daily.  OXcarbazepine (TRILEPTAL) 150 MG tablet, bid.  [facility] - [facility].

## 2025-01-23 NOTE — PROGRESS NOTES
\"Have you been to the ER, urgent care clinic since your last visit?  Hospitalized since your last visit?\"    YES - When: approximately 10 days ago.  Where and Why: [facility] , [address] Pain, nausea, chronic pain .    “Have you seen or consulted any other health care providers outside our system since your last visit?”    YES - When: approximately 2  weeks ago.  Where and Why: endo, pschy, rheumatology .      “Have you had a colorectal cancer screening such as a colonoscopy/FIT/Cologuard?    NO    No colonoscopy on file  No cologuard on file  No FIT/FOBT on file   No flexible sigmoidoscopy on file     “Have you had a diabetic eye exam?”    YES - Where: [facility]  Nurse/CMA to request most recent records if not in the chart     No diabetic eye exam on file

## 2025-03-24 ENCOUNTER — PATIENT MESSAGE (OUTPATIENT)
Facility: CLINIC | Age: 50
End: 2025-03-24

## 2025-03-27 DIAGNOSIS — M79.7 FIBROMYALGIA: ICD-10-CM

## 2025-03-27 DIAGNOSIS — M62.838 MUSCLE SPASM: ICD-10-CM

## 2025-03-28 RX ORDER — PROMETHAZINE HYDROCHLORIDE 12.5 MG/1
12.5 TABLET ORAL DAILY PRN
Qty: 90 TABLET | Refills: 0 | Status: SHIPPED | OUTPATIENT
Start: 2025-03-28

## 2025-03-28 RX ORDER — BACLOFEN 10 MG/1
10 TABLET ORAL 3 TIMES DAILY
Qty: 90 TABLET | Refills: 1 | Status: SHIPPED | OUTPATIENT
Start: 2025-03-28

## 2025-03-28 RX ORDER — IBUPROFEN 400 MG/1
400 TABLET, FILM COATED ORAL 2 TIMES DAILY PRN
Qty: 60 TABLET | Refills: 1 | Status: SHIPPED | OUTPATIENT
Start: 2025-03-28

## 2025-03-28 RX ORDER — CYCLOBENZAPRINE HCL 10 MG
10 TABLET ORAL 2 TIMES DAILY PRN
Qty: 60 TABLET | Refills: 1 | Status: SHIPPED | OUTPATIENT
Start: 2025-03-28

## 2025-04-08 DIAGNOSIS — M79.7 FIBROMYALGIA: ICD-10-CM

## 2025-04-08 RX ORDER — PROMETHAZINE HYDROCHLORIDE 12.5 MG/1
12.5 TABLET ORAL DAILY PRN
Qty: 30 TABLET | Refills: 0 | Status: SHIPPED | OUTPATIENT
Start: 2025-04-08

## 2025-04-09 ENCOUNTER — PATIENT MESSAGE (OUTPATIENT)
Facility: CLINIC | Age: 50
End: 2025-04-09

## 2025-04-22 ENCOUNTER — PATIENT MESSAGE (OUTPATIENT)
Facility: CLINIC | Age: 50
End: 2025-04-22

## 2025-08-01 DIAGNOSIS — M79.7 FIBROMYALGIA: ICD-10-CM

## 2025-08-01 RX ORDER — CYCLOBENZAPRINE HCL 10 MG
TABLET ORAL
Qty: 60 TABLET | Refills: 2 | OUTPATIENT
Start: 2025-08-01

## 2025-08-10 DIAGNOSIS — M62.838 MUSCLE SPASM: ICD-10-CM

## 2025-08-10 DIAGNOSIS — M79.7 FIBROMYALGIA: ICD-10-CM

## 2025-08-11 RX ORDER — BACLOFEN 10 MG/1
10 TABLET ORAL 3 TIMES DAILY
Qty: 90 TABLET | Refills: 2 | OUTPATIENT
Start: 2025-08-11

## 2025-08-21 DIAGNOSIS — M79.7 FIBROMYALGIA: ICD-10-CM

## 2025-08-21 DIAGNOSIS — M62.838 MUSCLE SPASM: ICD-10-CM

## 2025-08-21 RX ORDER — IBUPROFEN 400 MG/1
TABLET, FILM COATED ORAL
Qty: 60 TABLET | Refills: 1 | OUTPATIENT
Start: 2025-08-21